# Patient Record
Sex: MALE | Race: BLACK OR AFRICAN AMERICAN | Employment: FULL TIME | URBAN - METROPOLITAN AREA
[De-identification: names, ages, dates, MRNs, and addresses within clinical notes are randomized per-mention and may not be internally consistent; named-entity substitution may affect disease eponyms.]

---

## 2017-01-11 RX ORDER — LORATADINE 10 MG/1
TABLET ORAL
Qty: 30 TABLET | Refills: 3 | Status: SHIPPED | OUTPATIENT
Start: 2017-01-11 | End: 2017-03-28 | Stop reason: ALTCHOICE

## 2017-03-28 ENCOUNTER — OFFICE VISIT (OUTPATIENT)
Dept: INTERNAL MEDICINE CLINIC | Age: 42
End: 2017-03-28

## 2017-03-28 VITALS
WEIGHT: 235 LBS | RESPIRATION RATE: 18 BRPM | BODY MASS INDEX: 31.14 KG/M2 | HEIGHT: 73 IN | HEART RATE: 100 BPM | SYSTOLIC BLOOD PRESSURE: 144 MMHG | DIASTOLIC BLOOD PRESSURE: 90 MMHG | OXYGEN SATURATION: 99 %

## 2017-03-28 DIAGNOSIS — I10 ESSENTIAL HYPERTENSION: Primary | ICD-10-CM

## 2017-03-28 DIAGNOSIS — F41.9 ANXIETY: ICD-10-CM

## 2017-03-28 PROCEDURE — 99213 OFFICE O/P EST LOW 20 MIN: CPT | Performed by: FAMILY MEDICINE

## 2017-03-28 RX ORDER — HYDROXYZINE PAMOATE 25 MG/1
25 CAPSULE ORAL 3 TIMES DAILY PRN
Qty: 30 CAPSULE | Refills: 0 | Status: SHIPPED | OUTPATIENT
Start: 2017-03-28 | End: 2017-04-11

## 2017-03-28 RX ORDER — AMLODIPINE BESYLATE 10 MG/1
10 TABLET ORAL DAILY
Qty: 90 TABLET | Refills: 1 | Status: SHIPPED | OUTPATIENT
Start: 2017-03-28 | End: 2017-06-16 | Stop reason: SDUPTHER

## 2017-03-28 ASSESSMENT — ENCOUNTER SYMPTOMS
BLOOD IN STOOL: 0
ABDOMINAL PAIN: 0
TROUBLE SWALLOWING: 0
DIARRHEA: 0
VOICE CHANGE: 0
SHORTNESS OF BREATH: 0
CONSTIPATION: 0

## 2017-03-31 ENCOUNTER — NURSE ONLY (OUTPATIENT)
Dept: INTERNAL MEDICINE CLINIC | Age: 42
End: 2017-03-31

## 2017-03-31 VITALS — DIASTOLIC BLOOD PRESSURE: 80 MMHG | SYSTOLIC BLOOD PRESSURE: 136 MMHG

## 2017-03-31 DIAGNOSIS — I10 ESSENTIAL HYPERTENSION: Primary | ICD-10-CM

## 2017-03-31 RX ORDER — SILDENAFIL 50 MG/1
50 TABLET, FILM COATED ORAL PRN
Qty: 6 TABLET | Refills: 1 | Status: SHIPPED | OUTPATIENT
Start: 2017-03-31 | End: 2017-12-22

## 2017-04-27 RX ORDER — AMLODIPINE BESYLATE 5 MG/1
TABLET ORAL
Qty: 30 TABLET | Refills: 3 | OUTPATIENT
Start: 2017-04-27

## 2017-06-16 ENCOUNTER — OFFICE VISIT (OUTPATIENT)
Dept: INTERNAL MEDICINE CLINIC | Age: 42
End: 2017-06-16

## 2017-06-16 VITALS
HEART RATE: 92 BPM | OXYGEN SATURATION: 99 % | SYSTOLIC BLOOD PRESSURE: 140 MMHG | RESPIRATION RATE: 20 BRPM | DIASTOLIC BLOOD PRESSURE: 96 MMHG | WEIGHT: 236 LBS | BODY MASS INDEX: 31.57 KG/M2

## 2017-06-16 DIAGNOSIS — Z23 NEED FOR PNEUMOCOCCAL VACCINATION: ICD-10-CM

## 2017-06-16 DIAGNOSIS — E66.9 OBESITY (BMI 30.0-34.9): ICD-10-CM

## 2017-06-16 DIAGNOSIS — N18.3 CKD (CHRONIC KIDNEY DISEASE), STAGE 3 (MODERATE): ICD-10-CM

## 2017-06-16 DIAGNOSIS — I10 ESSENTIAL HYPERTENSION: Primary | ICD-10-CM

## 2017-06-16 DIAGNOSIS — E78.5 HYPERLIPIDEMIA LDL GOAL <100: ICD-10-CM

## 2017-06-16 DIAGNOSIS — F41.9 ANXIETY: ICD-10-CM

## 2017-06-16 PROBLEM — N18.9 CKD (CHRONIC KIDNEY DISEASE): Status: ACTIVE | Noted: 2017-06-16

## 2017-06-16 PROCEDURE — 99214 OFFICE O/P EST MOD 30 MIN: CPT | Performed by: FAMILY MEDICINE

## 2017-06-16 PROCEDURE — 90732 PPSV23 VACC 2 YRS+ SUBQ/IM: CPT | Performed by: FAMILY MEDICINE

## 2017-06-16 PROCEDURE — 90471 IMMUNIZATION ADMIN: CPT | Performed by: FAMILY MEDICINE

## 2017-06-16 RX ORDER — AMLODIPINE BESYLATE 10 MG/1
10 TABLET ORAL DAILY
Qty: 90 TABLET | Refills: 1 | Status: SHIPPED | OUTPATIENT
Start: 2017-06-16 | End: 2017-12-13 | Stop reason: SDUPTHER

## 2017-06-16 ASSESSMENT — ENCOUNTER SYMPTOMS
CONSTIPATION: 0
DIARRHEA: 0
BLOOD IN STOOL: 0
SHORTNESS OF BREATH: 0
TROUBLE SWALLOWING: 0
VOICE CHANGE: 0
ABDOMINAL PAIN: 0

## 2017-06-26 RX ORDER — HYDROCHLOROTHIAZIDE 25 MG/1
TABLET ORAL
Qty: 90 TABLET | Refills: 1 | Status: SHIPPED | OUTPATIENT
Start: 2017-06-26 | End: 2017-09-22 | Stop reason: SDUPTHER

## 2017-06-26 RX ORDER — LISINOPRIL 40 MG/1
TABLET ORAL
Qty: 90 TABLET | Refills: 1 | Status: SHIPPED | OUTPATIENT
Start: 2017-06-26 | End: 2020-03-25 | Stop reason: SDUPTHER

## 2017-09-08 RX ORDER — LORATADINE 10 MG/1
TABLET ORAL
Qty: 30 TABLET | Refills: 3 | Status: SHIPPED | OUTPATIENT
Start: 2017-09-08 | End: 2017-12-22 | Stop reason: SINTOL

## 2017-09-22 ENCOUNTER — OFFICE VISIT (OUTPATIENT)
Dept: INTERNAL MEDICINE CLINIC | Age: 42
End: 2017-09-22

## 2017-09-22 VITALS
BODY MASS INDEX: 32.62 KG/M2 | WEIGHT: 233 LBS | HEART RATE: 92 BPM | RESPIRATION RATE: 18 BRPM | HEIGHT: 71 IN | OXYGEN SATURATION: 96 % | DIASTOLIC BLOOD PRESSURE: 82 MMHG | SYSTOLIC BLOOD PRESSURE: 128 MMHG

## 2017-09-22 DIAGNOSIS — N18.3 CKD (CHRONIC KIDNEY DISEASE), STAGE 3 (MODERATE): ICD-10-CM

## 2017-09-22 DIAGNOSIS — I10 ESSENTIAL HYPERTENSION: ICD-10-CM

## 2017-09-22 DIAGNOSIS — E78.5 HYPERLIPIDEMIA LDL GOAL <100: ICD-10-CM

## 2017-09-22 DIAGNOSIS — Z23 NEED FOR INFLUENZA VACCINATION: ICD-10-CM

## 2017-09-22 PROCEDURE — 99214 OFFICE O/P EST MOD 30 MIN: CPT | Performed by: FAMILY MEDICINE

## 2017-09-22 PROCEDURE — 90686 IIV4 VACC NO PRSV 0.5 ML IM: CPT | Performed by: FAMILY MEDICINE

## 2017-09-22 PROCEDURE — 90471 IMMUNIZATION ADMIN: CPT | Performed by: FAMILY MEDICINE

## 2017-09-22 RX ORDER — HYDROCHLOROTHIAZIDE 25 MG/1
TABLET ORAL
Qty: 90 TABLET | Refills: 1 | Status: SHIPPED | OUTPATIENT
Start: 2017-09-22 | End: 2018-04-09 | Stop reason: SDUPTHER

## 2017-09-22 ASSESSMENT — ENCOUNTER SYMPTOMS
SHORTNESS OF BREATH: 0
CONSTIPATION: 0
ABDOMINAL PAIN: 0
TROUBLE SWALLOWING: 0
VOICE CHANGE: 0
BLOOD IN STOOL: 0
DIARRHEA: 0

## 2017-12-13 RX ORDER — AMLODIPINE BESYLATE 10 MG/1
10 TABLET ORAL DAILY
Qty: 90 TABLET | Refills: 1 | Status: SHIPPED | OUTPATIENT
Start: 2017-12-13 | End: 2017-12-22 | Stop reason: SDUPTHER

## 2017-12-18 RX ORDER — AMLODIPINE BESYLATE 10 MG/1
10 TABLET ORAL DAILY
Qty: 90 TABLET | Refills: 1 | Status: SHIPPED | OUTPATIENT
Start: 2017-12-18 | End: 2018-08-20

## 2017-12-22 ENCOUNTER — OFFICE VISIT (OUTPATIENT)
Dept: INTERNAL MEDICINE CLINIC | Age: 42
End: 2017-12-22

## 2017-12-22 VITALS
WEIGHT: 232 LBS | BODY MASS INDEX: 32.82 KG/M2 | DIASTOLIC BLOOD PRESSURE: 86 MMHG | SYSTOLIC BLOOD PRESSURE: 138 MMHG | HEART RATE: 116 BPM

## 2017-12-22 DIAGNOSIS — Z72.0 NICOTINE ABUSE: ICD-10-CM

## 2017-12-22 DIAGNOSIS — I10 ESSENTIAL HYPERTENSION: ICD-10-CM

## 2017-12-22 DIAGNOSIS — F41.9 ANXIETY: ICD-10-CM

## 2017-12-22 DIAGNOSIS — E78.5 HYPERLIPIDEMIA LDL GOAL <100: ICD-10-CM

## 2017-12-22 PROCEDURE — 99214 OFFICE O/P EST MOD 30 MIN: CPT | Performed by: FAMILY MEDICINE

## 2017-12-22 RX ORDER — NICOTINE 21 MG/24HR
1 PATCH, TRANSDERMAL 24 HOURS TRANSDERMAL EVERY 24 HOURS
Qty: 30 PATCH | Refills: 3 | Status: SHIPPED | OUTPATIENT
Start: 2017-12-22 | End: 2020-03-25

## 2017-12-22 RX ORDER — BUPROPION HYDROCHLORIDE 150 MG/1
150 TABLET ORAL EVERY MORNING
Qty: 30 TABLET | Refills: 3 | Status: SHIPPED | OUTPATIENT
Start: 2017-12-22 | End: 2020-03-25

## 2017-12-22 ASSESSMENT — ENCOUNTER SYMPTOMS
CONSTIPATION: 0
ABDOMINAL PAIN: 0
VOICE CHANGE: 0
BLOOD IN STOOL: 0
TROUBLE SWALLOWING: 0
SHORTNESS OF BREATH: 0
DIARRHEA: 0

## 2018-03-05 RX ORDER — AMLODIPINE BESYLATE 10 MG/1
TABLET ORAL
Qty: 90 TABLET | Refills: 1 | Status: SHIPPED | OUTPATIENT
Start: 2018-03-05 | End: 2018-11-19

## 2018-04-09 RX ORDER — HYDROCHLOROTHIAZIDE 25 MG/1
TABLET ORAL
Qty: 90 TABLET | Refills: 1 | Status: SHIPPED | OUTPATIENT
Start: 2018-04-09 | End: 2020-03-25 | Stop reason: SDUPTHER

## 2018-08-20 RX ORDER — LORATADINE 10 MG/1
10 TABLET ORAL DAILY
Qty: 90 TABLET | Refills: 1 | Status: SHIPPED | OUTPATIENT
Start: 2018-08-20 | End: 2020-03-25

## 2018-10-19 RX ORDER — AMLODIPINE BESYLATE 10 MG/1
10 TABLET ORAL DAILY
Qty: 30 TABLET | Refills: 0 | Status: SHIPPED | OUTPATIENT
Start: 2018-10-19 | End: 2020-03-25 | Stop reason: SDUPTHER

## 2018-11-19 RX ORDER — AMLODIPINE BESYLATE 10 MG/1
TABLET ORAL
Qty: 30 TABLET | Refills: 0 | OUTPATIENT
Start: 2018-11-19

## 2019-06-13 RX ORDER — BUPROPION HYDROCHLORIDE 150 MG/1
150 TABLET ORAL EVERY MORNING
Qty: 30 TABLET | Refills: 0 | OUTPATIENT
Start: 2019-06-13

## 2019-09-13 RX ORDER — LORATADINE 10 MG/1
TABLET ORAL
Qty: 90 TABLET | Refills: 0 | OUTPATIENT
Start: 2019-09-13

## 2020-03-25 ENCOUNTER — OFFICE VISIT (OUTPATIENT)
Dept: INTERNAL MEDICINE CLINIC | Age: 45
End: 2020-03-25
Payer: COMMERCIAL

## 2020-03-25 VITALS
DIASTOLIC BLOOD PRESSURE: 130 MMHG | HEIGHT: 71 IN | WEIGHT: 208.8 LBS | BODY MASS INDEX: 29.23 KG/M2 | RESPIRATION RATE: 18 BRPM | HEART RATE: 92 BPM | SYSTOLIC BLOOD PRESSURE: 210 MMHG | OXYGEN SATURATION: 99 %

## 2020-03-25 LAB
A/G RATIO: 1.2 (ref 1.1–2.2)
ALBUMIN SERPL-MCNC: 4.2 G/DL (ref 3.4–5)
ALP BLD-CCNC: 85 U/L (ref 40–129)
ALT SERPL-CCNC: 8 U/L (ref 10–40)
ANION GAP SERPL CALCULATED.3IONS-SCNC: 17 MMOL/L (ref 3–16)
AST SERPL-CCNC: 15 U/L (ref 15–37)
BASOPHILS ABSOLUTE: 0.1 K/UL (ref 0–0.2)
BASOPHILS RELATIVE PERCENT: 0.6 %
BILIRUB SERPL-MCNC: 0.8 MG/DL (ref 0–1)
BUN BLDV-MCNC: 26 MG/DL (ref 7–20)
CALCIUM SERPL-MCNC: 9.6 MG/DL (ref 8.3–10.6)
CHLORIDE BLD-SCNC: 102 MMOL/L (ref 99–110)
CHOLESTEROL, FASTING: 200 MG/DL (ref 0–199)
CO2: 22 MMOL/L (ref 21–32)
CREAT SERPL-MCNC: 4 MG/DL (ref 0.9–1.3)
EOSINOPHILS ABSOLUTE: 0.3 K/UL (ref 0–0.6)
EOSINOPHILS RELATIVE PERCENT: 2.1 %
GFR AFRICAN AMERICAN: 20
GFR NON-AFRICAN AMERICAN: 16
GLOBULIN: 3.4 G/DL
GLUCOSE FASTING: 125 MG/DL (ref 70–99)
HCT VFR BLD CALC: 41.9 % (ref 40.5–52.5)
HDLC SERPL-MCNC: 51 MG/DL (ref 40–60)
HEMOGLOBIN: 13.6 G/DL (ref 13.5–17.5)
LDL CHOLESTEROL CALCULATED: 128 MG/DL
LYMPHOCYTES ABSOLUTE: 2.2 K/UL (ref 1–5.1)
LYMPHOCYTES RELATIVE PERCENT: 15 %
MCH RBC QN AUTO: 29.2 PG (ref 26–34)
MCHC RBC AUTO-ENTMCNC: 32.5 G/DL (ref 31–36)
MCV RBC AUTO: 89.8 FL (ref 80–100)
MONOCYTES ABSOLUTE: 0.9 K/UL (ref 0–1.3)
MONOCYTES RELATIVE PERCENT: 5.8 %
NEUTROPHILS ABSOLUTE: 11.3 K/UL (ref 1.7–7.7)
NEUTROPHILS RELATIVE PERCENT: 76.5 %
PDW BLD-RTO: 14.5 % (ref 12.4–15.4)
PLATELET # BLD: 235 K/UL (ref 135–450)
PMV BLD AUTO: 10 FL (ref 5–10.5)
POTASSIUM SERPL-SCNC: 3.3 MMOL/L (ref 3.5–5.1)
RBC # BLD: 4.67 M/UL (ref 4.2–5.9)
SODIUM BLD-SCNC: 141 MMOL/L (ref 136–145)
T4 FREE: 1.3 NG/DL (ref 0.9–1.8)
TOTAL PROTEIN: 7.6 G/DL (ref 6.4–8.2)
TRIGLYCERIDE, FASTING: 105 MG/DL (ref 0–150)
TSH SERPL DL<=0.05 MIU/L-ACNC: 1.57 UIU/ML (ref 0.27–4.2)
VLDLC SERPL CALC-MCNC: 21 MG/DL
WBC # BLD: 14.8 K/UL (ref 4–11)

## 2020-03-25 PROCEDURE — 99214 OFFICE O/P EST MOD 30 MIN: CPT | Performed by: FAMILY MEDICINE

## 2020-03-25 RX ORDER — AMLODIPINE BESYLATE 10 MG/1
10 TABLET ORAL DAILY
Qty: 90 TABLET | Refills: 1 | Status: SHIPPED | OUTPATIENT
Start: 2020-03-25 | End: 2020-12-07 | Stop reason: SDUPTHER

## 2020-03-25 RX ORDER — HYDROCHLOROTHIAZIDE 25 MG/1
TABLET ORAL
Qty: 90 TABLET | Refills: 1 | Status: SHIPPED | OUTPATIENT
Start: 2020-03-25 | End: 2020-09-14

## 2020-03-25 RX ORDER — LISINOPRIL 40 MG/1
TABLET ORAL
Qty: 90 TABLET | Refills: 1 | Status: SHIPPED | OUTPATIENT
Start: 2020-03-25 | End: 2020-09-22

## 2020-03-25 RX ORDER — PANTOPRAZOLE SODIUM 40 MG/1
40 TABLET, DELAYED RELEASE ORAL
Qty: 30 TABLET | Refills: 5 | Status: SHIPPED | OUTPATIENT
Start: 2020-03-25 | End: 2020-12-07 | Stop reason: SDUPTHER

## 2020-03-25 SDOH — ECONOMIC STABILITY: INCOME INSECURITY: HOW HARD IS IT FOR YOU TO PAY FOR THE VERY BASICS LIKE FOOD, HOUSING, MEDICAL CARE, AND HEATING?: SOMEWHAT HARD

## 2020-03-25 SDOH — ECONOMIC STABILITY: FOOD INSECURITY: WITHIN THE PAST 12 MONTHS, THE FOOD YOU BOUGHT JUST DIDN'T LAST AND YOU DIDN'T HAVE MONEY TO GET MORE.: SOMETIMES TRUE

## 2020-03-25 SDOH — ECONOMIC STABILITY: FOOD INSECURITY: WITHIN THE PAST 12 MONTHS, YOU WORRIED THAT YOUR FOOD WOULD RUN OUT BEFORE YOU GOT MONEY TO BUY MORE.: SOMETIMES TRUE

## 2020-03-25 SDOH — ECONOMIC STABILITY: TRANSPORTATION INSECURITY
IN THE PAST 12 MONTHS, HAS THE LACK OF TRANSPORTATION KEPT YOU FROM MEDICAL APPOINTMENTS OR FROM GETTING MEDICATIONS?: NO

## 2020-03-25 SDOH — ECONOMIC STABILITY: TRANSPORTATION INSECURITY
IN THE PAST 12 MONTHS, HAS LACK OF TRANSPORTATION KEPT YOU FROM MEETINGS, WORK, OR FROM GETTING THINGS NEEDED FOR DAILY LIVING?: NO

## 2020-03-25 ASSESSMENT — ENCOUNTER SYMPTOMS
CONSTIPATION: 0
TROUBLE SWALLOWING: 0
VOICE CHANGE: 0
SHORTNESS OF BREATH: 0
ABDOMINAL PAIN: 0
DIARRHEA: 0
BLOOD IN STOOL: 0

## 2020-03-25 ASSESSMENT — PATIENT HEALTH QUESTIONNAIRE - PHQ9
SUM OF ALL RESPONSES TO PHQ9 QUESTIONS 1 & 2: 1
2. FEELING DOWN, DEPRESSED OR HOPELESS: 0
SUM OF ALL RESPONSES TO PHQ QUESTIONS 1-9: 1
SUM OF ALL RESPONSES TO PHQ QUESTIONS 1-9: 1
1. LITTLE INTEREST OR PLEASURE IN DOING THINGS: 1

## 2020-03-25 NOTE — PROGRESS NOTES
to poor food intake started after working second shift. Will order blood tests including CBC, TSH and free T4 and CMP. We will also order CT of the abdomen to rule out occult malignancy. - CT ABDOMEN PELVIS W IV CONTRAST Additional Contrast? Radiologist Recommendation; Future    3. Essential hypertension  Poorly controlled due to poor compliance. Restart amlodipine 10 mg daily, hydrochlorothiazide 25 mg daily and lisinopril 40 mg daily. Will recheck blood pressure in 2 to 4 weeks.         An electronic signature was used to authenticate this note.    --Jeniffer Raphael MD on 3/25/2020 at 11:41 AM

## 2020-03-26 ENCOUNTER — TELEPHONE (OUTPATIENT)
Dept: INTERNAL MEDICINE CLINIC | Age: 45
End: 2020-03-26

## 2020-05-11 ENCOUNTER — TELEPHONE (OUTPATIENT)
Dept: INTERNAL MEDICINE CLINIC | Age: 45
End: 2020-05-11

## 2020-05-12 NOTE — TELEPHONE ENCOUNTER
He did not go to the kidney specialist due to the cost $2500.00. That is his deductible. He has stopped all supplements for 2 months. He will try to go next month. I told him it very important to go to the specialist to prevent further damage to his kidneys. Could recheck the labs?

## 2020-05-13 RX ORDER — BLOOD PRESSURE TEST KIT
KIT MISCELLANEOUS
Qty: 1 KIT | Refills: 0 | Status: SHIPPED | OUTPATIENT
Start: 2020-05-13 | End: 2022-10-14

## 2020-05-13 NOTE — TELEPHONE ENCOUNTER
Per Dr Nataly Denny:  Repeat blood test in one week if not better, need to see a kidney specialist.   Be sure BP is controlled. Called pt. Left a detailed VM of this. Advised to go to [facility] for lab draw. Orders placed.

## 2020-09-14 RX ORDER — HYDROCHLOROTHIAZIDE 25 MG/1
TABLET ORAL
Qty: 90 TABLET | Refills: 1 | Status: SHIPPED | OUTPATIENT
Start: 2020-09-14 | End: 2020-09-22

## 2020-12-07 ENCOUNTER — OFFICE VISIT (OUTPATIENT)
Dept: PRIMARY CARE CLINIC | Age: 45
End: 2020-12-07
Payer: COMMERCIAL

## 2020-12-07 VITALS
HEART RATE: 94 BPM | WEIGHT: 270.8 LBS | SYSTOLIC BLOOD PRESSURE: 151 MMHG | BODY MASS INDEX: 37.77 KG/M2 | RESPIRATION RATE: 18 BRPM | DIASTOLIC BLOOD PRESSURE: 102 MMHG | TEMPERATURE: 97.7 F | OXYGEN SATURATION: 97 %

## 2020-12-07 PROBLEM — R73.09 ELEVATED GLUCOSE: Status: ACTIVE | Noted: 2020-12-07

## 2020-12-07 PROBLEM — Z91.199 POOR COMPLIANCE: Status: ACTIVE | Noted: 2020-12-07

## 2020-12-07 LAB — HBA1C MFR BLD: 6.2 %

## 2020-12-07 PROCEDURE — 90471 IMMUNIZATION ADMIN: CPT | Performed by: FAMILY MEDICINE

## 2020-12-07 PROCEDURE — 83036 HEMOGLOBIN GLYCOSYLATED A1C: CPT | Performed by: FAMILY MEDICINE

## 2020-12-07 PROCEDURE — 90686 IIV4 VACC NO PRSV 0.5 ML IM: CPT | Performed by: FAMILY MEDICINE

## 2020-12-07 PROCEDURE — 99214 OFFICE O/P EST MOD 30 MIN: CPT | Performed by: FAMILY MEDICINE

## 2020-12-07 RX ORDER — AMLODIPINE BESYLATE 10 MG/1
10 TABLET ORAL DAILY
Qty: 90 TABLET | Refills: 1 | Status: SHIPPED | OUTPATIENT
Start: 2020-12-07 | End: 2021-10-06 | Stop reason: SDUPTHER

## 2020-12-07 RX ORDER — METOPROLOL SUCCINATE 50 MG/1
50 TABLET, EXTENDED RELEASE ORAL DAILY
Qty: 90 TABLET | Refills: 1 | Status: SHIPPED | OUTPATIENT
Start: 2020-12-07 | End: 2021-10-06 | Stop reason: SDUPTHER

## 2020-12-07 RX ORDER — PANTOPRAZOLE SODIUM 40 MG/1
40 TABLET, DELAYED RELEASE ORAL
Qty: 30 TABLET | Refills: 5 | Status: SHIPPED | OUTPATIENT
Start: 2020-12-07 | End: 2022-10-14

## 2020-12-07 ASSESSMENT — ENCOUNTER SYMPTOMS
DIARRHEA: 0
ABDOMINAL PAIN: 0
TROUBLE SWALLOWING: 0
VOICE CHANGE: 0
BLOOD IN STOOL: 0
CONSTIPATION: 0
SHORTNESS OF BREATH: 0

## 2020-12-07 NOTE — PROGRESS NOTES
2020     Jennifer Beyer (:  1975) is a 39 y.o. male, here for evaluation of the following medical concerns:    HPI  Patient is poorly compliant with medication and office visit. He was recently diagnosed with stage V chronic kidney disease likely due to poorly controlled hypertension. He now takes amlodipine 10 mg daily His blood pressure last visit was  520 systolic blood pressure now is 161 systolic. He denies chest pain or shortness of breath denies leg edema. Despite stage V chronic kidney disease patient denies any symptoms. He has no nausea or vomiting. He has hyperlipidemia not yet on a statin. He has elevated glucose but no overt sign of diabetes. Denies polyuria polydipsia. Review of Systems   Constitutional: Negative for activity change. HENT: Negative for trouble swallowing and voice change. Eyes: Negative for visual disturbance. Respiratory: Negative for shortness of breath. Cardiovascular: Negative for chest pain and leg swelling. Gastrointestinal: Negative for abdominal pain, blood in stool, constipation and diarrhea. Genitourinary: Negative for difficulty urinating, dysuria, frequency, hematuria and scrotal swelling. Musculoskeletal: Negative for arthralgias and myalgias. Skin: Negative for rash. Neurological: Negative for dizziness. Psychiatric/Behavioral: Negative for behavioral problems. Prior to Visit Medications    Medication Sig Taking?  Authorizing Provider   amLODIPine (NORVASC) 10 MG tablet Take 1 tablet by mouth daily Yes Paola Ocasio MD   pantoprazole (PROTONIX) 40 MG tablet Take 1 tablet by mouth every morning (before breakfast) Yes Paloa Ocasio MD   Tdap (ADACEL) 5-2-15.5 LF-MCG/0.5 injection Inject 0.5 mLs into the muscle once for 1 dose Yes Paola Ocasio MD   metoprolol succinate (TOPROL XL) 50 MG extended release tablet Take 1 tablet by mouth daily Yes Paola Ocasio MD   Blood Pressure KIT Check Blood Pressure 1-2 Stage 5 chronic kidney disease not on chronic dialysis [facility])  Patient reported that he has no symptoms and feeling okay. Will refer to nephrologist for further evaluation and management. - Lipid Panel; Future  - Comprehensive Metabolic Panel; Future  - CBC Auto Differential; Future  - AFL - Zev Rios MD, Nephrology, [facility]    3. Hyperlipidemia LDL goal <100  Will recheck lipid profile and if LDL is still elevated will start  statin next visit. 4. Elevated glucose  Patient has no overt sign of diabetes. Denies polyuria polydipsia. - POCT glycosylated hemoglobin (Hb A1C)    5.  Need for influenza vaccination  - INFLUENZA, QUADV, 3 YRS AND OLDER, IM PF, PREFILL SYR OR SDV, 0.5ML (AFLURIA QUADV, PF)      RTC in 2-3 mos and PRN    An electronic signature was used to authenticate this note.    --Eduardo Altamirano MD on 12/7/2020 at 6:02 PM

## 2020-12-07 NOTE — PROGRESS NOTES
Vaccine Information Sheet, \"Influenza - Inactivated\"  given to Marvin Castillo, or parent/legal guardian of  Marvin Castillo and verbalized understanding. Patient responses:    Have you ever had a reaction to a flu vaccine? No  Do you have any current illness? No  Have you ever had Guillian [place] Syndrome? No  Do you have a serious allergy to any of the follow: Neomycin, Polymyxin, Thimerosal, eggs or egg products? No    Flu vaccine given per order. Please see immunization tab. Risks and benefits explained. Current VIS given.

## 2021-10-06 ENCOUNTER — OFFICE VISIT (OUTPATIENT)
Dept: PRIMARY CARE CLINIC | Age: 46
End: 2021-10-06
Payer: COMMERCIAL

## 2021-10-06 VITALS
OXYGEN SATURATION: 95 % | DIASTOLIC BLOOD PRESSURE: 131 MMHG | BODY MASS INDEX: 36.26 KG/M2 | RESPIRATION RATE: 18 BRPM | HEART RATE: 96 BPM | SYSTOLIC BLOOD PRESSURE: 177 MMHG | WEIGHT: 260 LBS

## 2021-10-06 DIAGNOSIS — Z00.00 ENCOUNTER FOR WELL ADULT EXAM WITHOUT ABNORMAL FINDINGS: ICD-10-CM

## 2021-10-06 DIAGNOSIS — E66.9 OBESITY (BMI 30.0-34.9): ICD-10-CM

## 2021-10-06 DIAGNOSIS — Z91.199 POOR COMPLIANCE: ICD-10-CM

## 2021-10-06 DIAGNOSIS — Z11.4 SCREENING FOR HIV (HUMAN IMMUNODEFICIENCY VIRUS): ICD-10-CM

## 2021-10-06 DIAGNOSIS — R73.09 ELEVATED GLUCOSE: ICD-10-CM

## 2021-10-06 DIAGNOSIS — Z23 NEED FOR INFLUENZA VACCINATION: ICD-10-CM

## 2021-10-06 DIAGNOSIS — Z12.11 SCREENING FOR COLORECTAL CANCER: ICD-10-CM

## 2021-10-06 DIAGNOSIS — I10 ESSENTIAL HYPERTENSION: ICD-10-CM

## 2021-10-06 DIAGNOSIS — Z11.59 NEED FOR HEPATITIS C SCREENING TEST: ICD-10-CM

## 2021-10-06 DIAGNOSIS — Z00.00 PREVENTATIVE HEALTH CARE: Primary | ICD-10-CM

## 2021-10-06 DIAGNOSIS — Z23 NEED FOR IMMUNIZATION AGAINST INFLUENZA: ICD-10-CM

## 2021-10-06 DIAGNOSIS — Z12.12 SCREENING FOR COLORECTAL CANCER: ICD-10-CM

## 2021-10-06 DIAGNOSIS — N18.5 STAGE 5 CHRONIC KIDNEY DISEASE NOT ON CHRONIC DIALYSIS (HCC): ICD-10-CM

## 2021-10-06 PROCEDURE — 99396 PREV VISIT EST AGE 40-64: CPT | Performed by: FAMILY MEDICINE

## 2021-10-06 PROCEDURE — 90471 IMMUNIZATION ADMIN: CPT | Performed by: FAMILY MEDICINE

## 2021-10-06 PROCEDURE — 90694 VACC AIIV4 NO PRSRV 0.5ML IM: CPT | Performed by: FAMILY MEDICINE

## 2021-10-06 RX ORDER — METOPROLOL SUCCINATE 100 MG/1
100 TABLET, EXTENDED RELEASE ORAL DAILY
Qty: 90 TABLET | Refills: 1 | Status: SHIPPED | OUTPATIENT
Start: 2021-10-06 | End: 2022-04-11

## 2021-10-06 RX ORDER — AMLODIPINE BESYLATE 10 MG/1
10 TABLET ORAL DAILY
Qty: 90 TABLET | Refills: 1 | Status: SHIPPED | OUTPATIENT
Start: 2021-10-06 | End: 2022-04-11

## 2021-10-06 SDOH — ECONOMIC STABILITY: FOOD INSECURITY: WITHIN THE PAST 12 MONTHS, YOU WORRIED THAT YOUR FOOD WOULD RUN OUT BEFORE YOU GOT MONEY TO BUY MORE.: NEVER TRUE

## 2021-10-06 SDOH — ECONOMIC STABILITY: FOOD INSECURITY: WITHIN THE PAST 12 MONTHS, THE FOOD YOU BOUGHT JUST DIDN'T LAST AND YOU DIDN'T HAVE MONEY TO GET MORE.: NEVER TRUE

## 2021-10-06 ASSESSMENT — PATIENT HEALTH QUESTIONNAIRE - PHQ9
SUM OF ALL RESPONSES TO PHQ QUESTIONS 1-9: 0
SUM OF ALL RESPONSES TO PHQ QUESTIONS 1-9: 0
SUM OF ALL RESPONSES TO PHQ9 QUESTIONS 1 & 2: 0
SUM OF ALL RESPONSES TO PHQ QUESTIONS 1-9: 0
2. FEELING DOWN, DEPRESSED OR HOPELESS: 0
1. LITTLE INTEREST OR PLEASURE IN DOING THINGS: 0

## 2021-10-06 ASSESSMENT — SOCIAL DETERMINANTS OF HEALTH (SDOH): HOW HARD IS IT FOR YOU TO PAY FOR THE VERY BASICS LIKE FOOD, HOUSING, MEDICAL CARE, AND HEATING?: NOT HARD AT ALL

## 2021-10-06 ASSESSMENT — ENCOUNTER SYMPTOMS
SHORTNESS OF BREATH: 0
VOICE CHANGE: 0
CONSTIPATION: 0
TROUBLE SWALLOWING: 0
DIARRHEA: 0
BLOOD IN STOOL: 0
ABDOMINAL PAIN: 0

## 2021-10-06 NOTE — PROGRESS NOTES
Well Adult Note  Name: Li Mccoy Date: 10/6/2021   MRN: [id number] Sex: Male   Age: 39 y.o. Ethnicity: Non- / Non    : 1975 Race: Ruthann Ro / African American      Dejuan Somers is here for well adult exam.  History:  Patient presented to the office for checkup. He has a new job and requested clearance to go to work. He was told that because of his HTN needs to be cleared before he can go  to work. Has hypertension and poorly compliant with medication. He has not refilled his medicine for months. He takes Toprol-XL 50 mg daily and amlodipine 10 mg daily. He is no longer on lisinopril and hydrochlorothiazide due to chronic kidney disease stage V which has gotten worse in the past 1 year. His chronic kidney disease likely related to poorly controlled hypertension. He has impaired fasting glucose but no overt sign of diabetes. Denies polyuria and polydipsia. He is obese and struggling to lose weight. Patient reported \" I dont feel bad\". Denies nausea and vomiting, denies SOB or chest pain,denies bowel or urinary disturbances. Denies leg edema      Review of Systems   Constitutional: Negative for activity change. HENT: Negative for trouble swallowing and voice change. Eyes: Negative for visual disturbance. Respiratory: Negative for shortness of breath. Cardiovascular: Negative for chest pain and leg swelling. Gastrointestinal: Negative for abdominal pain, blood in stool, constipation and diarrhea. Genitourinary: Negative for difficulty urinating, dysuria, frequency, hematuria and scrotal swelling. Musculoskeletal: Negative for arthralgias and myalgias. Skin: Negative for rash. Neurological: Negative for dizziness. Psychiatric/Behavioral: Negative for behavioral problems. No Known Allergies      Prior to Visit Medications    Medication Sig Taking?  Authorizing Provider   amLODIPine (NORVASC) 10 MG tablet Take 1 tablet by mouth daily Yes Edgar Delgado MD   metoprolol succinate (TOPROL XL) 100 MG extended release tablet Take 1 tablet by mouth daily Yes Larisa Bergeron MD   pantoprazole (PROTONIX) 40 MG tablet Take 1 tablet by mouth every morning (before breakfast)  Patient not taking: Reported on 10/6/2021  Larisa Bergeron MD   Blood Pressure KIT Check Blood Pressure 1-2 times daily. Larisa Bergeron MD         Past Medical History:   Diagnosis Date    Anxiety 3/28/2017    CKD (chronic kidney disease) 6/16/2017    Hyperlipidemia LDL goal <100 6/16/2017    Hypertension     Obesity (BMI 30.0-34.9) 6/16/2017       No past surgical history on file. No family history on file. Social History     Tobacco Use    Smoking status: Current Some Day Smoker     Types: Cigars    Smokeless tobacco: Never Used    Tobacco comment:  2 a week   Substance Use Topics    Alcohol use: No    Drug use: No       Objective   BP (!) 177/131   Pulse 96   Resp 18   Wt 260 lb (117.9 kg)   SpO2 95%   BMI 36.26 kg/m²   Wt Readings from Last 3 Encounters:   10/06/21 260 lb (117.9 kg)   12/07/20 270 lb 12.8 oz (122.8 kg)   03/25/20 208 lb 12.8 oz (94.7 kg)       Physical Exam  Constitutional:       General: He is not in acute distress. Appearance: He is well-developed. HENT:      Head: Normocephalic. Eyes:      Conjunctiva/sclera: Conjunctivae normal.   Neck:      Thyroid: No thyromegaly. Cardiovascular:      Rate and Rhythm: Normal rate and regular rhythm. Heart sounds: Normal heart sounds. No murmur heard. Pulmonary:      Effort: No respiratory distress. Breath sounds: Normal breath sounds. No wheezing or rales. Abdominal:      General: There is no distension. Palpations: Abdomen is soft. Musculoskeletal:         General: Normal range of motion. Cervical back: Neck supple. Skin:     General: Skin is warm. Findings: No rash. Neurological:      Mental Status: He is alert and oriented to person, place, and time.    Psychiatric: Behavior: Behavior normal.           Assessment   Plan   1. Preventative health care  Will order blood test and update health maintenance record. Patient is due for colon cancer screening. Will give flu shot today. -     CBC Auto Differential; Future  -     T4, Free; Future  -     TSH without Reflex; Future  -     Lipid, Fasting; Future  -     Comprehensive Metabolic Panel, Fasting; Future  -     HIV Screen; Future  -     Hepatitis C Antibody; Future  -     Hemoglobin A1C; Future    2. Essential hypertension  Poorly compliant with medication. For months has not  refill his medication. Blood pressure still elevated today. He Is no longer on lisinopril and hydrochlorothiazide due to chronic kidney disease stage V . Will increase Toprol-XL from 50 mg to 100 mg daily. Continue amlodipine 10 mg daily. Will come back next week to recheck BP    3. Stage 5 chronic kidney disease not on chronic dialysis [facility])  Has not seen the nephrologist , Dr Kim Santiago despite multiple referral.  Patient reported that \" they ask for $ 2000 upfront fee \" before he can be seen and \" I dont have that much money\"  Advised to avoid NSAIDs and other nephrotoxic drug. Avoid dehydration. Will refer to Dr Greg Villalobos next visit    4. Obesity (BMI 30.0-34. 9)  Encouraged to lose weight with diet and exercise. 5. Poor compliance  He Is poorly compliant with office visit and medication. He is close to getting dis. letter    6. Screening for HIV (human immunodeficiency virus)  -     HIV Screen; Future    7. Need for hepatitis C screening test  -     Hepatitis C Antibody; Future    8. Elevated glucose  -     Hemoglobin A1C; Future    9.  Need for influenza vaccination  -     INFLUENZA, QUADV, ADJUVANTED, 72 YRS =, IM, PF, PREFILL SYR, 0.5ML (FLUAD)         Personalized Preventive Plan   Current Health Maintenance Status  Immunization History   Administered Date(s) Administered    COVID-19, J&J, PF, 0.5 mL 04/10/2021    Influenza, Quadv, IM, PF (6 mo and older Fluzone, Flulaval, Fluarix, and 3 yrs and older Afluria) 09/22/2017, 12/07/2020    Influenza, Quadv, adjuvanted, 65 yrs +, IM, PF (Fluad) 10/06/2021    Pneumococcal Polysaccharide ([id number]) 06/16/2017    Td, unspecified formulation 03/15/2011        Health Maintenance   Topic Date Due    Hepatitis C screen  Never done    HIV screen  Never done    DTaP/Tdap/Td vaccine (1 - Tdap) 03/16/2011    Colon cancer screen colonoscopy  Never done    Potassium monitoring  03/25/2021    Creatinine monitoring  03/25/2021    A1C test (Diabetic or Prediabetic)  12/07/2021    Lipid screen  03/25/2025    Pneumococcal 0-64 years Vaccine (2 of 2 - PPSV23) 11/19/2040    Flu vaccine  Completed    COVID-19 Vaccine  Completed    Hepatitis A vaccine  Aged Out    Hepatitis B vaccine  Aged Out    Hib vaccine  Aged Out    Meningococcal (ACWY) vaccine  Aged Out     Recommendations for Massive Damage Due: see orders and patient instructions/AVS.  .

## 2021-10-06 NOTE — PROGRESS NOTES
Vaccine Information Sheet, \"Influenza - Inactivated\"  given to Teresita Trotter, or parent/legal guardian of  Teresita Trotter and verbalized understanding. Patient responses:    Have you ever had a reaction to a flu vaccine? No  Do you have any current illness? No  Have you ever had Guillian [place] Syndrome? No  Do you have a serious allergy to any of the follow: Neomycin, Polymyxin, Thimerosal, eggs or egg products? No    Flu vaccine given per order. Please see immunization tab. Risks and benefits explained. Current VIS given.

## 2021-10-22 ENCOUNTER — TELEPHONE (OUTPATIENT)
Dept: PRIMARY CARE CLINIC | Age: 46
End: 2021-10-22

## 2021-10-22 NOTE — TELEPHONE ENCOUNTER
Per Dr Irma Castaneda refer to Nephrologist DR Sariah Howe [phone]. The patient has been notified of this information and all questions answered.

## 2021-11-10 ENCOUNTER — TELEPHONE (OUTPATIENT)
Dept: PRIMARY CARE CLINIC | Age: 46
End: 2021-11-10

## 2021-11-10 NOTE — TELEPHONE ENCOUNTER
Left detailed VM for pt:  POC Fit test not returned yet. Please try to send it back. If any questions, please call.

## 2022-04-11 RX ORDER — METOPROLOL SUCCINATE 100 MG/1
100 TABLET, EXTENDED RELEASE ORAL DAILY
Qty: 90 TABLET | Refills: 0 | Status: SHIPPED | OUTPATIENT
Start: 2022-04-11 | End: 2022-08-11

## 2022-04-11 RX ORDER — AMLODIPINE BESYLATE 10 MG/1
10 TABLET ORAL DAILY
Qty: 90 TABLET | Refills: 0 | Status: SHIPPED | OUTPATIENT
Start: 2022-04-11 | End: 2022-08-11

## 2022-04-11 NOTE — TELEPHONE ENCOUNTER
Pt needs to be seen again for any further refills because:     1) last BP reading was 177/131    2) A1C is now showing DM. 3) had No-Show in January 2022.

## 2022-06-21 ENCOUNTER — PATIENT MESSAGE (OUTPATIENT)
Dept: PRIMARY CARE CLINIC | Age: 47
End: 2022-06-21
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
## 2022-06-23 ENCOUNTER — OFFICE VISIT (OUTPATIENT)
Dept: PRIMARY CARE CLINIC | Age: 47
End: 2022-06-23
Payer: COMMERCIAL

## 2022-06-23 VITALS
BODY MASS INDEX: 32.2 KG/M2 | HEART RATE: 84 BPM | SYSTOLIC BLOOD PRESSURE: 167 MMHG | HEIGHT: 71 IN | DIASTOLIC BLOOD PRESSURE: 119 MMHG | TEMPERATURE: 99.1 F | WEIGHT: 230 LBS | OXYGEN SATURATION: 98 %

## 2022-06-23 DIAGNOSIS — I10 ESSENTIAL HYPERTENSION: ICD-10-CM

## 2022-06-23 DIAGNOSIS — M54.50 ACUTE LOW BACK PAIN WITHOUT SCIATICA, UNSPECIFIED BACK PAIN LATERALITY: Primary | ICD-10-CM

## 2022-06-23 DIAGNOSIS — R73.09 ELEVATED GLUCOSE: ICD-10-CM

## 2022-06-23 DIAGNOSIS — N18.5 STAGE 5 CHRONIC KIDNEY DISEASE NOT ON CHRONIC DIALYSIS (HCC): ICD-10-CM

## 2022-06-23 DIAGNOSIS — M54.2 NECK PAIN: ICD-10-CM

## 2022-06-23 LAB — HBA1C MFR BLD: 6 %

## 2022-06-23 PROCEDURE — 99214 OFFICE O/P EST MOD 30 MIN: CPT | Performed by: NURSE PRACTITIONER

## 2022-06-23 PROCEDURE — G8427 DOCREV CUR MEDS BY ELIG CLIN: HCPCS | Performed by: NURSE PRACTITIONER

## 2022-06-23 PROCEDURE — 4004F PT TOBACCO SCREEN RCVD TLK: CPT | Performed by: NURSE PRACTITIONER

## 2022-06-23 PROCEDURE — G8417 CALC BMI ABV UP PARAM F/U: HCPCS | Performed by: NURSE PRACTITIONER

## 2022-06-23 PROCEDURE — 83036 HEMOGLOBIN GLYCOSYLATED A1C: CPT | Performed by: NURSE PRACTITIONER

## 2022-06-23 RX ORDER — IBUPROFEN 800 MG/1
800 TABLET ORAL
Qty: 90 TABLET | Refills: 0 | Status: SHIPPED | OUTPATIENT
Start: 2022-06-23 | End: 2022-06-23 | Stop reason: ALTCHOICE

## 2022-06-23 RX ORDER — PREDNISONE 20 MG/1
20 TABLET ORAL DAILY
Qty: 5 TABLET | Refills: 0 | Status: SHIPPED | OUTPATIENT
Start: 2022-06-23 | End: 2022-06-28

## 2022-06-23 RX ORDER — METHOCARBAMOL 500 MG/1
500 TABLET, FILM COATED ORAL 3 TIMES DAILY
Qty: 30 TABLET | Refills: 0 | Status: SHIPPED | OUTPATIENT
Start: 2022-06-23 | End: 2022-07-03

## 2022-06-23 ASSESSMENT — ENCOUNTER SYMPTOMS
COUGH: 0
DIARRHEA: 0
CHEST TIGHTNESS: 0
STRIDOR: 0
BLOOD IN STOOL: 0
BACK PAIN: 1
ABDOMINAL DISTENTION: 0
BOWEL INCONTINENCE: 0
VOMITING: 0

## 2022-06-23 ASSESSMENT — PATIENT HEALTH QUESTIONNAIRE - PHQ9
2. FEELING DOWN, DEPRESSED OR HOPELESS: 0
SUM OF ALL RESPONSES TO PHQ QUESTIONS 1-9: 0
1. LITTLE INTEREST OR PLEASURE IN DOING THINGS: 0
SUM OF ALL RESPONSES TO PHQ QUESTIONS 1-9: 0
SUM OF ALL RESPONSES TO PHQ QUESTIONS 1-9: 0
SUM OF ALL RESPONSES TO PHQ9 QUESTIONS 1 & 2: 0
SUM OF ALL RESPONSES TO PHQ QUESTIONS 1-9: 0

## 2022-06-23 NOTE — PATIENT INSTRUCTIONS
Muscle relaxer can cause drowsiness- No driving or drinking  Take Prednisone as prescribed  Do not take Ibuprofen  Schedule appointment with PCP within 2 weeks
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
## 2022-08-11 RX ORDER — AMLODIPINE BESYLATE 10 MG/1
TABLET ORAL
Qty: 90 TABLET | Refills: 0 | Status: SHIPPED | OUTPATIENT
Start: 2022-08-11 | End: 2022-10-14 | Stop reason: SDUPTHER

## 2022-08-11 RX ORDER — METOPROLOL SUCCINATE 100 MG/1
100 TABLET, EXTENDED RELEASE ORAL DAILY
Qty: 90 TABLET | Refills: 0 | Status: SHIPPED | OUTPATIENT
Start: 2022-08-11 | End: 2022-08-16 | Stop reason: SDUPTHER

## 2022-08-15 NOTE — TELEPHONE ENCOUNTER
Medication:   Requested Prescriptions     Pending Prescriptions Disp Refills    metoprolol succinate (TOPROL XL) 100 MG extended release tablet 90 tablet 0     Sig: Take 1 tablet by mouth in the morning. Last Filled:      Patient Phone Number: [phone] (home)     Last appt: 6/23/2022   Next appt: Visit date not found    Last OARRS: No flowsheet data found.

## 2022-08-16 RX ORDER — METOPROLOL SUCCINATE 100 MG/1
100 TABLET, EXTENDED RELEASE ORAL DAILY
Qty: 90 TABLET | Refills: 0 | Status: ON HOLD | OUTPATIENT
Start: 2022-08-16 | End: 2022-10-21 | Stop reason: HOSPADM

## 2022-10-13 ENCOUNTER — NURSE TRIAGE (OUTPATIENT)
Dept: OTHER | Facility: CLINIC | Age: 47
End: 2022-10-13

## 2022-10-13 NOTE — TELEPHONE ENCOUNTER
[facility] call from Divya Riddle at [place] with [employer]. Subjective: Caller states \"fluid in legs\"     Current Symptoms: see above, both of thighs are swollen, can't bend well, sob with exertion    Onset: around 1 week ago; unchanged    Associated Symptoms: reduced activity, constipation    Pain Severity: 8/10; sharp, numbness; constant    Temperature: denies fever     What has been tried: nothing    LMP: NA Pregnant: NA    Recommended disposition: See in Office Today    Care advice provided, patient verbalizes understanding; denies any other questions or concerns; instructed to call back for any new or worsening symptoms. Patient/Caller agrees with recommended disposition; writer provided warm transfer to [facility] at [place] for appointment scheduling    Attention Provider: Thank you for allowing me to participate in the care of your patient. The patient was connected to triage in response to information provided to the ECC/PSC. Please do not respond through this encounter as the response is not directed to a shared pool.          Reason for Disposition   Difficulty breathing with exertion AND worsening or new-onset    Protocols used: Leg Swelling and Edema-ADULT-[place]

## 2022-10-14 ENCOUNTER — TELEPHONE (OUTPATIENT)
Dept: PRIMARY CARE CLINIC | Age: 47
End: 2022-10-14

## 2022-10-14 ENCOUNTER — HOSPITAL ENCOUNTER (INPATIENT)
Age: 47
LOS: 6 days | Discharge: HOME | DRG: 286 | End: 2022-10-21
Attending: STUDENT IN AN ORGANIZED HEALTH CARE EDUCATION/TRAINING PROGRAM | Admitting: INTERNAL MEDICINE

## 2022-10-14 ENCOUNTER — OFFICE VISIT (OUTPATIENT)
Dept: PRIMARY CARE CLINIC | Age: 47
End: 2022-10-14
Payer: COMMERCIAL

## 2022-10-14 VITALS
SYSTOLIC BLOOD PRESSURE: 162 MMHG | OXYGEN SATURATION: 98 % | TEMPERATURE: 99 F | WEIGHT: 270 LBS | BODY MASS INDEX: 37.66 KG/M2 | HEART RATE: 74 BPM | DIASTOLIC BLOOD PRESSURE: 104 MMHG

## 2022-10-14 DIAGNOSIS — N18.5 STAGE 5 CHRONIC KIDNEY DISEASE NOT ON CHRONIC DIALYSIS (HCC): ICD-10-CM

## 2022-10-14 DIAGNOSIS — D64.9 ANEMIA, UNSPECIFIED TYPE: ICD-10-CM

## 2022-10-14 DIAGNOSIS — R60.0 EDEMA OF BOTH LEGS: ICD-10-CM

## 2022-10-14 DIAGNOSIS — R06.02 SOB (SHORTNESS OF BREATH): ICD-10-CM

## 2022-10-14 DIAGNOSIS — N18.6 ESRD (END STAGE RENAL DISEASE) (HCC): ICD-10-CM

## 2022-10-14 DIAGNOSIS — Z91.199 POOR COMPLIANCE: ICD-10-CM

## 2022-10-14 DIAGNOSIS — N18.5 STAGE 5 CHRONIC KIDNEY DISEASE NOT ON CHRONIC DIALYSIS (HCC): Primary | ICD-10-CM

## 2022-10-14 DIAGNOSIS — M79.89 LEG SWELLING: ICD-10-CM

## 2022-10-14 DIAGNOSIS — N17.9 ACUTE RENAL FAILURE, UNSPECIFIED ACUTE RENAL FAILURE TYPE (HCC): Primary | ICD-10-CM

## 2022-10-14 DIAGNOSIS — R06.02 SHORTNESS OF BREATH: ICD-10-CM

## 2022-10-14 DIAGNOSIS — I10 ESSENTIAL HYPERTENSION: ICD-10-CM

## 2022-10-14 LAB
BASOPHILS ABSOLUTE: 0.1 K/UL (ref 0–0.2)
BASOPHILS RELATIVE PERCENT: 1.6 %
EOSINOPHILS ABSOLUTE: 0.2 K/UL (ref 0–0.6)
EOSINOPHILS RELATIVE PERCENT: 2.9 %
HCT VFR BLD CALC: 17.5 % (ref 40.5–52.5)
HEMOGLOBIN: 5.7 G/DL (ref 13.5–17.5)
LYMPHOCYTES ABSOLUTE: 1 K/UL (ref 1–5.1)
LYMPHOCYTES RELATIVE PERCENT: 11.5 %
MCH RBC QN AUTO: 29.2 PG (ref 26–34)
MCHC RBC AUTO-ENTMCNC: 32.5 G/DL (ref 31–36)
MCV RBC AUTO: 89.9 FL (ref 80–100)
MONOCYTES ABSOLUTE: 0.7 K/UL (ref 0–1.3)
MONOCYTES RELATIVE PERCENT: 8.3 %
NEUTROPHILS ABSOLUTE: 6.3 K/UL (ref 1.7–7.7)
NEUTROPHILS RELATIVE PERCENT: 75.7 %
PARATHYROID HORMONE INTACT: 796.5 PG/ML (ref 14–72)
PDW BLD-RTO: 15.5 % (ref 12.4–15.4)
PLATELET # BLD: 273 K/UL (ref 135–450)
PMV BLD AUTO: 8.4 FL (ref 5–10.5)
POC OCCULT BLOOD STOOL: POSITIVE
RBC # BLD: 1.95 M/UL (ref 4.2–5.9)
SEDIMENTATION RATE, ERYTHROCYTE: 27 MM/HR (ref 0–15)
WBC # BLD: 8.4 K/UL (ref 4–11)

## 2022-10-14 PROCEDURE — 4004F PT TOBACCO SCREEN RCVD TLK: CPT | Performed by: FAMILY MEDICINE

## 2022-10-14 PROCEDURE — 96365 THER/PROPH/DIAG IV INF INIT: CPT

## 2022-10-14 PROCEDURE — G8484 FLU IMMUNIZE NO ADMIN: HCPCS | Performed by: FAMILY MEDICINE

## 2022-10-14 PROCEDURE — 93005 ELECTROCARDIOGRAM TRACING: CPT | Performed by: INTERNAL MEDICINE

## 2022-10-14 PROCEDURE — 99285 EMERGENCY DEPT VISIT HI MDM: CPT

## 2022-10-14 PROCEDURE — 82272 OCCULT BLD FECES 1-3 TESTS: CPT

## 2022-10-14 PROCEDURE — G8417 CALC BMI ABV UP PARAM F/U: HCPCS | Performed by: FAMILY MEDICINE

## 2022-10-14 PROCEDURE — 99214 OFFICE O/P EST MOD 30 MIN: CPT | Performed by: FAMILY MEDICINE

## 2022-10-14 PROCEDURE — 96375 TX/PRO/DX INJ NEW DRUG ADDON: CPT

## 2022-10-14 PROCEDURE — G8427 DOCREV CUR MEDS BY ELIG CLIN: HCPCS | Performed by: FAMILY MEDICINE

## 2022-10-14 PROCEDURE — 36415 COLL VENOUS BLD VENIPUNCTURE: CPT

## 2022-10-14 RX ORDER — HYDRALAZINE HYDROCHLORIDE 100 MG/1
100 TABLET, FILM COATED ORAL 2 TIMES DAILY
Qty: 180 TABLET | Refills: 1 | Status: ON HOLD | OUTPATIENT
Start: 2022-10-14 | End: 2022-10-21 | Stop reason: HOSPADM

## 2022-10-14 RX ORDER — PANTOPRAZOLE SODIUM 40 MG/10ML
80 INJECTION, POWDER, LYOPHILIZED, FOR SOLUTION INTRAVENOUS ONCE
Status: COMPLETED | OUTPATIENT
Start: 2022-10-15 | End: 2022-10-15

## 2022-10-14 RX ORDER — FUROSEMIDE 40 MG/1
40 TABLET ORAL DAILY
Qty: 60 TABLET | Refills: 3 | Status: ON HOLD | OUTPATIENT
Start: 2022-10-14 | End: 2022-10-21 | Stop reason: HOSPADM

## 2022-10-14 RX ORDER — AMLODIPINE BESYLATE 5 MG/1
5 TABLET ORAL DAILY
Qty: 90 TABLET | Refills: 1 | Status: ON HOLD | OUTPATIENT
Start: 2022-10-14 | End: 2022-10-21 | Stop reason: HOSPADM

## 2022-10-14 ASSESSMENT — ENCOUNTER SYMPTOMS
ABDOMINAL PAIN: 0
BLOOD IN STOOL: 0
VOICE CHANGE: 0
SHORTNESS OF BREATH: 1
TROUBLE SWALLOWING: 0
COUGH: 0
CONSTIPATION: 0
DIARRHEA: 0

## 2022-10-14 NOTE — LETTER
[address]  [address] [place] [address]  Phone: [phone]             October 21, 2022    Patient: Becca Michel   YOB: 1975   Date of Visit: 10/14/2022       To Whom It May Concern:    Becca Michel was seen and treated in our facility  beginning 10/14/2022 until 10/21/2022. He can return to work on 10/25/2022. Restrictions: no lifting, no heavy duty.       Sincerely,       Isabella Calvo RN         Signature:__________________________________

## 2022-10-14 NOTE — PROGRESS NOTES
10/14/2022     Joe Moreno (:  1975) is a 55 y.o. male, here for evaluation of the following medical concerns:    Shortness of Breath  Associated symptoms include leg swelling. Pertinent negatives include no abdominal pain, chest pain or rash. Patient is 55years old black male patient history of hypertension and chronic kidney disease stage V poorly compliant with medication. Many times in the past he was asked to see a nephrologist for further evaluation and treatment of his chronic kidney disease but patient never made an appointment. His hypertension is still poorly controlled, takes amlodipine 10 mg daily and Toprol- mg daily. He presented to the office today due to progressive shortness of breath with minimal activity associated with bilateral leg edema. He denies chest pain. Weight 20 to 30 pounds in past few months though his weight has been fluctuating for many months. Review of Systems   Constitutional:  Positive for fatigue and unexpected weight change. Negative for activity change. HENT:  Negative for trouble swallowing and voice change. Eyes:  Negative for visual disturbance. Respiratory:  Positive for shortness of breath. Negative for cough. Cardiovascular:  Positive for leg swelling. Negative for chest pain. Gastrointestinal:  Negative for abdominal pain, blood in stool, constipation and diarrhea. Genitourinary:  Negative for difficulty urinating, dysuria, frequency, hematuria and scrotal swelling. Musculoskeletal:  Negative for arthralgias and myalgias. Skin:  Negative for rash. Neurological:  Negative for dizziness. Psychiatric/Behavioral:  Negative for behavioral problems. Prior to Visit Medications    Medication Sig Taking?  Authorizing Provider   furosemide (LASIX) 40 MG tablet Take 1 tablet by mouth daily Yes Diana Ramos MD   hydrALAZINE (APRESOLINE) 100 MG tablet Take 1 tablet by mouth 2 times daily Yes Diana Ramos MD amLODIPine (NORVASC) 5 MG tablet Take 1 tablet by mouth daily Yes Veena Limon MD   metoprolol succinate (TOPROL XL) 100 MG extended release tablet Take 1 tablet by mouth in the morning. Yes Veena Limon MD        Social History     Tobacco Use    Smoking status: Some Days     Types: Cigars    Smokeless tobacco: Never    Tobacco comments:      2 a week   Substance Use Topics    Alcohol use: No        Vitals:    10/14/22 1145 10/14/22 1150   BP: (!) 162/95 (!) 162/104   Pulse: 74    Temp: 99 °F (37.2 °C)    TempSrc: Temporal    SpO2: 98%    Weight: 270 lb (122.5 kg)      Estimated body mass index is 37.66 kg/m² as calculated from the following:    Height as of 6/23/22: 5' 11\" (1.803 m). Weight as of this encounter: 270 lb (122.5 kg). Physical Exam  Constitutional:       Appearance: He is well-developed. HENT:      Head: Normocephalic. Eyes:      Conjunctiva/sclera: Conjunctivae normal.      Pupils: Pupils are equal, round, and reactive to light. Neck:      Thyroid: No thyromegaly. Cardiovascular:      Rate and Rhythm: Normal rate and regular rhythm. Heart sounds: Normal heart sounds. No murmur heard. No gallop. Pulmonary:      Effort: Pulmonary effort is normal.      Breath sounds: Normal breath sounds. Abdominal:      General: Bowel sounds are normal.      Palpations: Abdomen is soft. There is no mass. Genitourinary:     Penis: Normal.       Prostate: Normal.   Musculoskeletal:         General: Normal range of motion. Cervical back: Normal range of motion and neck supple. Right lower leg: Edema present. Left lower leg: Edema present. Skin:     General: Skin is warm. Findings: No rash. Neurological:      Mental Status: He is alert. Psychiatric:         Behavior: Behavior normal.       ASSESSMENT/PLAN:  1. Stage 5 chronic kidney disease not on chronic dialysis ([facility] [address]  He is very poorly compliant with medication.   Chronic kidney disease likely related to poorly controlled hypertension. Will recheck renal profile to evaluate current status of kidney disease but I suspect he may need to be on dialysis very soon. Will refer to nephrologist Dr. Donis Jackson  - External Referral To Nephrology  - ECHO Complete 2D W Doppler W Color  - CBC with Auto Differential; Future  - Sedimentation Rate; Future  - Comprehensive Metabolic Panel; Future  - Lipid Panel; Future  - PSA Screening; Future  - TSH; Future  - Uric Acid; Future  - Vitamin D 25 Hydroxy  - PTH, Intact; Future    2. Edema of both legs / 3. SOB (shortness of breath  Will order echocardiogram to evaluate LV function to rule out congestive heart failure. It could also be due to fluid retention due to underlying chronic kidney disease and will refer  to nephrologist.  Meantime will start Lasix 40 mg daily  - External Referral To Nephrology  - furosemide (LASIX) 40 MG tablet; Take 1 tablet by mouth daily  Dispense: 60 tablet; Refill: 3  - ECHO Complete 2D W Doppler W Color      4. Essential hypertension  Due to underlying leg edema will reduce amlodipine 10 mg to 5 mg daily. Blood pressure is still poorly controlled so will add hydralazine 100 mg twice daily. Continue Toprol- mg daily  - hydrALAZINE (APRESOLINE) 100 MG tablet; Take 1 tablet by mouth 2 times daily  Dispense: 180 tablet; Refill: 1  - amLODIPine (NORVASC) 5 MG tablet; Take 1 tablet by mouth daily  Dispense: 90 tablet; Refill: 1    5.  Poor compliance    RTC in 1 month    An electronic signature was used to authenticate this note.    --Kyleigh Meek MD on 10/14/2022 at 12:17 PM

## 2022-10-14 NOTE — Clinical Note
Discharge Plan[de-identified] Other/[facility])   Telemetry/Cardiac Monitoring Required?: Yes

## 2022-10-15 ENCOUNTER — APPOINTMENT (OUTPATIENT)
Dept: GENERAL RADIOLOGY | Age: 47
DRG: 286 | End: 2022-10-15

## 2022-10-15 PROBLEM — N17.9 ACUTE RENAL FAILURE (ARF) (HCC): Status: ACTIVE | Noted: 2022-10-15

## 2022-10-15 PROBLEM — N18.6 ESRD (END STAGE RENAL DISEASE) (HCC): Status: ACTIVE | Noted: 2022-10-15

## 2022-10-15 LAB
A/G RATIO: 1.3 (ref 1.1–2.2)
ABO/RH: NORMAL
ALBUMIN SERPL-MCNC: 3.7 G/DL (ref 3.4–5)
ALBUMIN SERPL-MCNC: 3.7 G/DL (ref 3.4–5)
ALP BLD-CCNC: 83 U/L (ref 40–129)
ALP BLD-CCNC: 84 U/L (ref 40–129)
ALT SERPL-CCNC: 16 U/L (ref 10–40)
ALT SERPL-CCNC: 17 U/L (ref 10–40)
ANION GAP SERPL CALCULATED.3IONS-SCNC: 22 MMOL/L (ref 3–16)
ANION GAP SERPL CALCULATED.3IONS-SCNC: 24 MMOL/L (ref 3–16)
ANTIBODY SCREEN: NORMAL
AST SERPL-CCNC: 13 U/L (ref 15–37)
AST SERPL-CCNC: 13 U/L (ref 15–37)
BASOPHILS ABSOLUTE: 0.1 K/UL (ref 0–0.2)
BASOPHILS RELATIVE PERCENT: 1.5 %
BILIRUB SERPL-MCNC: 0.3 MG/DL (ref 0–1)
BILIRUB SERPL-MCNC: <0.2 MG/DL (ref 0–1)
BILIRUBIN DIRECT: <0.2 MG/DL (ref 0–0.3)
BILIRUBIN URINE: NEGATIVE
BILIRUBIN, INDIRECT: ABNORMAL MG/DL (ref 0–1)
BLOOD BANK DISPENSE STATUS: NORMAL
BLOOD BANK PRODUCT CODE: NORMAL
BLOOD, URINE: ABNORMAL
BPU ID: NORMAL
BUN BLDV-MCNC: 110 MG/DL (ref 7–20)
BUN BLDV-MCNC: 117 MG/DL (ref 7–20)
CALCIUM SERPL-MCNC: 8 MG/DL (ref 8.3–10.6)
CALCIUM SERPL-MCNC: 8.3 MG/DL (ref 8.3–10.6)
CHLORIDE BLD-SCNC: 100 MMOL/L (ref 99–110)
CHLORIDE BLD-SCNC: 98 MMOL/L (ref 99–110)
CHOLESTEROL, TOTAL: 134 MG/DL (ref 0–199)
CLARITY: CLEAR
CO2: 18 MMOL/L (ref 21–32)
CO2: 18 MMOL/L (ref 21–32)
COLOR: YELLOW
CREAT SERPL-MCNC: 13.8 MG/DL (ref 0.9–1.3)
CREAT SERPL-MCNC: 14 MG/DL (ref 0.9–1.3)
DESCRIPTION BLOOD BANK: NORMAL
EKG ATRIAL RATE: 82 BPM
EKG DIAGNOSIS: NORMAL
EKG P AXIS: 46 DEGREES
EKG P-R INTERVAL: 184 MS
EKG Q-T INTERVAL: 428 MS
EKG QRS DURATION: 104 MS
EKG QTC CALCULATION (BAZETT): 500 MS
EKG R AXIS: 3 DEGREES
EKG T AXIS: 109 DEGREES
EKG VENTRICULAR RATE: 82 BPM
EOSINOPHILS ABSOLUTE: 0.3 K/UL (ref 0–0.6)
EOSINOPHILS RELATIVE PERCENT: 3.1 %
EPITHELIAL CELLS, UA: ABNORMAL /HPF (ref 0–5)
FERRITIN: 130.2 NG/ML (ref 30–400)
GFR AFRICAN AMERICAN: 5
GFR AFRICAN AMERICAN: 5
GFR NON-AFRICAN AMERICAN: 4
GFR NON-AFRICAN AMERICAN: 4
GLUCOSE BLD-MCNC: 115 MG/DL (ref 70–99)
GLUCOSE BLD-MCNC: 116 MG/DL (ref 70–99)
GLUCOSE URINE: NEGATIVE MG/DL
HCT VFR BLD CALC: 16.9 % (ref 40.5–52.5)
HCT VFR BLD CALC: 17.4 % (ref 40.5–52.5)
HCT VFR BLD CALC: 17.4 % (ref 40.5–52.5)
HCT VFR BLD CALC: 20.4 % (ref 40.5–52.5)
HCT VFR BLD CALC: 23.3 % (ref 40.5–52.5)
HCT VFR BLD CALC: 23.5 % (ref 40.5–52.5)
HDLC SERPL-MCNC: 38 MG/DL (ref 40–60)
HEMOGLOBIN: 5.6 G/DL (ref 13.5–17.5)
HEMOGLOBIN: 5.7 G/DL (ref 13.5–17.5)
HEMOGLOBIN: 5.7 G/DL (ref 13.5–17.5)
HEMOGLOBIN: 6.6 G/DL (ref 13.5–17.5)
HEMOGLOBIN: 8.1 G/DL (ref 13.5–17.5)
HYALINE CASTS: ABNORMAL /LPF (ref 0–2)
IMMATURE RETIC FRACT: 0.55 (ref 0.21–0.37)
INR BLD: 1.29 (ref 0.87–1.14)
IRON SATURATION: 7 % (ref 20–50)
IRON: 18 UG/DL (ref 59–158)
KETONES, URINE: NEGATIVE MG/DL
LACTATE DEHYDROGENASE: 201 U/L (ref 100–190)
LDL CHOLESTEROL CALCULATED: 76 MG/DL
LEUKOCYTE ESTERASE, URINE: NEGATIVE
LIPASE: 26 U/L (ref 13–60)
LV EF: 43 %
LVEF MODALITY: NORMAL
LYMPHOCYTES ABSOLUTE: 1.2 K/UL (ref 1–5.1)
LYMPHOCYTES RELATIVE PERCENT: 12.5 %
MAGNESIUM: 2.8 MG/DL (ref 1.8–2.4)
MCH RBC QN AUTO: 29 PG (ref 26–34)
MCHC RBC AUTO-ENTMCNC: 32.7 G/DL (ref 31–36)
MCV RBC AUTO: 88.7 FL (ref 80–100)
MICROSCOPIC EXAMINATION: YES
MONOCYTES ABSOLUTE: 0.8 K/UL (ref 0–1.3)
MONOCYTES RELATIVE PERCENT: 8.2 %
NEUTROPHILS ABSOLUTE: 7.4 K/UL (ref 1.7–7.7)
NEUTROPHILS RELATIVE PERCENT: 74.7 %
NITRITE, URINE: NEGATIVE
PDW BLD-RTO: 15.2 % (ref 12.4–15.4)
PH UA: 6 (ref 5–8)
PHOSPHORUS: 8.6 MG/DL (ref 2.5–4.9)
PLATELET # BLD: 281 K/UL (ref 135–450)
PMV BLD AUTO: 8.4 FL (ref 5–10.5)
POTASSIUM REFLEX MAGNESIUM: 3.6 MMOL/L (ref 3.5–5.1)
POTASSIUM SERPL-SCNC: 4.1 MMOL/L (ref 3.5–5.1)
PRO-BNP: ABNORMAL PG/ML (ref 0–124)
PROSTATE SPECIFIC ANTIGEN: 0.43 NG/ML (ref 0–4)
PROTEIN UA: 100 MG/DL
PROTHROMBIN TIME: 16 SEC (ref 11.7–14.5)
RBC # BLD: 1.96 M/UL (ref 4.2–5.9)
RBC UA: ABNORMAL /HPF (ref 0–4)
RETICULOCYTE ABSOLUTE COUNT: 0.07 M/UL
RETICULOCYTE COUNT PCT: 2.48 % (ref 0.5–2.18)
SODIUM BLD-SCNC: 138 MMOL/L (ref 136–145)
SODIUM BLD-SCNC: 142 MMOL/L (ref 136–145)
SPECIFIC GRAVITY UA: 1.02 (ref 1–1.03)
T4 FREE: 0.9 NG/DL (ref 0.9–1.8)
TOTAL IRON BINDING CAPACITY: 242 UG/DL (ref 260–445)
TOTAL PROTEIN: 6.5 G/DL (ref 6.4–8.2)
TOTAL PROTEIN: 6.7 G/DL (ref 6.4–8.2)
TRIGL SERPL-MCNC: 102 MG/DL (ref 0–150)
TROPONIN: 0.06 NG/ML
TSH SERPL DL<=0.05 MIU/L-ACNC: 1.53 UIU/ML (ref 0.27–4.2)
TSH SERPL DL<=0.05 MIU/L-ACNC: 1.75 UIU/ML (ref 0.27–4.2)
URIC ACID, SERUM: 10.6 MG/DL (ref 3.5–7.2)
URINE REFLEX TO CULTURE: YES
URINE TYPE: ABNORMAL
UROBILINOGEN, URINE: 0.2 E.U./DL
VLDLC SERPL CALC-MCNC: 20 MG/DL
WBC # BLD: 9.9 K/UL (ref 4–11)
WBC UA: ABNORMAL /HPF (ref 0–5)

## 2022-10-15 PROCEDURE — 83615 LACTATE (LD) (LDH) ENZYME: CPT

## 2022-10-15 PROCEDURE — 83880 ASSAY OF NATRIURETIC PEPTIDE: CPT

## 2022-10-15 PROCEDURE — 83540 ASSAY OF IRON: CPT

## 2022-10-15 PROCEDURE — 71045 X-RAY EXAM CHEST 1 VIEW: CPT

## 2022-10-15 PROCEDURE — 36430 TRANSFUSION BLD/BLD COMPNT: CPT

## 2022-10-15 PROCEDURE — 84484 ASSAY OF TROPONIN QUANT: CPT

## 2022-10-15 PROCEDURE — 6370000000 HC RX 637 (ALT 250 FOR IP)

## 2022-10-15 PROCEDURE — 2580000003 HC RX 258: Performed by: STUDENT IN AN ORGANIZED HEALTH CARE EDUCATION/TRAINING PROGRAM

## 2022-10-15 PROCEDURE — 81001 URINALYSIS AUTO W/SCOPE: CPT

## 2022-10-15 PROCEDURE — 87086 URINE CULTURE/COLONY COUNT: CPT

## 2022-10-15 PROCEDURE — 94761 N-INVAS EAR/PLS OXIMETRY MLT: CPT

## 2022-10-15 PROCEDURE — 36556 INSERT NON-TUNNEL CV CATH: CPT | Performed by: SURGERY

## 2022-10-15 PROCEDURE — 84443 ASSAY THYROID STIM HORMONE: CPT

## 2022-10-15 PROCEDURE — 90935 HEMODIALYSIS ONE EVALUATION: CPT

## 2022-10-15 PROCEDURE — 84439 ASSAY OF FREE THYROXINE: CPT

## 2022-10-15 PROCEDURE — 82728 ASSAY OF FERRITIN: CPT

## 2022-10-15 PROCEDURE — 86923 COMPATIBILITY TEST ELECTRIC: CPT

## 2022-10-15 PROCEDURE — 83550 IRON BINDING TEST: CPT

## 2022-10-15 PROCEDURE — 83735 ASSAY OF MAGNESIUM: CPT

## 2022-10-15 PROCEDURE — 86706 HEP B SURFACE ANTIBODY: CPT

## 2022-10-15 PROCEDURE — 86901 BLOOD TYPING SEROLOGIC RH(D): CPT

## 2022-10-15 PROCEDURE — 2500000003 HC RX 250 WO HCPCS

## 2022-10-15 PROCEDURE — 99221 1ST HOSP IP/OBS SF/LOW 40: CPT | Performed by: SURGERY

## 2022-10-15 PROCEDURE — P9016 RBC LEUKOCYTES REDUCED: HCPCS

## 2022-10-15 PROCEDURE — 86850 RBC ANTIBODY SCREEN: CPT

## 2022-10-15 PROCEDURE — 6370000000 HC RX 637 (ALT 250 FOR IP): Performed by: STUDENT IN AN ORGANIZED HEALTH CARE EDUCATION/TRAINING PROGRAM

## 2022-10-15 PROCEDURE — 80076 HEPATIC FUNCTION PANEL: CPT

## 2022-10-15 PROCEDURE — 83010 ASSAY OF HAPTOGLOBIN QUANT: CPT

## 2022-10-15 PROCEDURE — 85025 COMPLETE CBC W/AUTO DIFF WBC: CPT

## 2022-10-15 PROCEDURE — 87340 HEPATITIS B SURFACE AG IA: CPT

## 2022-10-15 PROCEDURE — C8929 TTE W OR WO FOL WCON,DOPPLER: HCPCS

## 2022-10-15 PROCEDURE — 85045 AUTOMATED RETICULOCYTE COUNT: CPT

## 2022-10-15 PROCEDURE — 5A1D70Z PERFORMANCE OF URINARY FILTRATION, INTERMITTENT, LESS THAN 6 HOURS PER DAY: ICD-10-PCS | Performed by: STUDENT IN AN ORGANIZED HEALTH CARE EDUCATION/TRAINING PROGRAM

## 2022-10-15 PROCEDURE — 84100 ASSAY OF PHOSPHORUS: CPT

## 2022-10-15 PROCEDURE — 86900 BLOOD TYPING SEROLOGIC ABO: CPT

## 2022-10-15 PROCEDURE — 2060000000 HC ICU INTERMEDIATE R&B

## 2022-10-15 PROCEDURE — C9113 INJ PANTOPRAZOLE SODIUM, VIA: HCPCS | Performed by: STUDENT IN AN ORGANIZED HEALTH CARE EDUCATION/TRAINING PROGRAM

## 2022-10-15 PROCEDURE — 85018 HEMOGLOBIN: CPT

## 2022-10-15 PROCEDURE — 2580000003 HC RX 258

## 2022-10-15 PROCEDURE — 6360000002 HC RX W HCPCS: Performed by: STUDENT IN AN ORGANIZED HEALTH CARE EDUCATION/TRAINING PROGRAM

## 2022-10-15 PROCEDURE — 86704 HEP B CORE ANTIBODY TOTAL: CPT

## 2022-10-15 PROCEDURE — 83690 ASSAY OF LIPASE: CPT

## 2022-10-15 PROCEDURE — 85610 PROTHROMBIN TIME: CPT

## 2022-10-15 PROCEDURE — 85014 HEMATOCRIT: CPT

## 2022-10-15 PROCEDURE — 80048 BASIC METABOLIC PNL TOTAL CA: CPT

## 2022-10-15 PROCEDURE — 6360000002 HC RX W HCPCS

## 2022-10-15 PROCEDURE — 36415 COLL VENOUS BLD VENIPUNCTURE: CPT

## 2022-10-15 PROCEDURE — 71046 X-RAY EXAM CHEST 2 VIEWS: CPT

## 2022-10-15 PROCEDURE — 02HV33Z INSERTION OF INFUSION DEVICE INTO SUPERIOR VENA CAVA, PERCUTANEOUS APPROACH: ICD-10-PCS

## 2022-10-15 PROCEDURE — 76942 ECHO GUIDE FOR BIOPSY: CPT | Performed by: SURGERY

## 2022-10-15 RX ORDER — ACETAMINOPHEN 325 MG/1
650 TABLET ORAL EVERY 6 HOURS PRN
Status: DISCONTINUED | OUTPATIENT
Start: 2022-10-15 | End: 2022-10-19

## 2022-10-15 RX ORDER — FUROSEMIDE 10 MG/ML
100 INJECTION INTRAMUSCULAR; INTRAVENOUS ONCE
Status: COMPLETED | OUTPATIENT
Start: 2022-10-15 | End: 2022-10-15

## 2022-10-15 RX ORDER — AMLODIPINE BESYLATE 10 MG/1
10 TABLET ORAL DAILY
Status: DISCONTINUED | OUTPATIENT
Start: 2022-10-16 | End: 2022-10-18

## 2022-10-15 RX ORDER — ACETAMINOPHEN 650 MG/1
650 SUPPOSITORY RECTAL EVERY 6 HOURS PRN
Status: DISCONTINUED | OUTPATIENT
Start: 2022-10-15 | End: 2022-10-19

## 2022-10-15 RX ORDER — HYDRALAZINE HYDROCHLORIDE 50 MG/1
50 TABLET, FILM COATED ORAL EVERY 8 HOURS SCHEDULED
Status: DISCONTINUED | OUTPATIENT
Start: 2022-10-16 | End: 2022-10-17

## 2022-10-15 RX ORDER — HEPARIN SODIUM 1000 [USP'U]/ML
5000 INJECTION, SOLUTION INTRAVENOUS; SUBCUTANEOUS ONCE
Status: COMPLETED | OUTPATIENT
Start: 2022-10-15 | End: 2022-10-15

## 2022-10-15 RX ORDER — CARVEDILOL 12.5 MG/1
12.5 TABLET ORAL 2 TIMES DAILY WITH MEALS
Status: DISCONTINUED | OUTPATIENT
Start: 2022-10-16 | End: 2022-10-18

## 2022-10-15 RX ORDER — SODIUM CHLORIDE 9 MG/ML
INJECTION, SOLUTION INTRAVENOUS PRN
Status: DISCONTINUED | OUTPATIENT
Start: 2022-10-15 | End: 2022-10-21 | Stop reason: HOSPADM

## 2022-10-15 RX ORDER — ETOMIDATE 2 MG/ML
INJECTION INTRAVENOUS
Status: DISCONTINUED
Start: 2022-10-15 | End: 2022-10-15

## 2022-10-15 RX ORDER — LABETALOL HYDROCHLORIDE 5 MG/ML
10 INJECTION, SOLUTION INTRAVENOUS ONCE
Status: COMPLETED | OUTPATIENT
Start: 2022-10-15 | End: 2022-10-15

## 2022-10-15 RX ORDER — PANTOPRAZOLE SODIUM 40 MG/1
40 TABLET, DELAYED RELEASE ORAL
Status: DISCONTINUED | OUTPATIENT
Start: 2022-10-15 | End: 2022-10-21 | Stop reason: HOSPADM

## 2022-10-15 RX ORDER — SODIUM CHLORIDE 0.9 % (FLUSH) 0.9 %
5-40 SYRINGE (ML) INJECTION EVERY 12 HOURS SCHEDULED
Status: DISCONTINUED | OUTPATIENT
Start: 2022-10-15 | End: 2022-10-21 | Stop reason: HOSPADM

## 2022-10-15 RX ORDER — METOPROLOL SUCCINATE 100 MG/1
100 TABLET, EXTENDED RELEASE ORAL DAILY
Status: DISCONTINUED | OUTPATIENT
Start: 2022-10-15 | End: 2022-10-15

## 2022-10-15 RX ORDER — HYDRALAZINE HYDROCHLORIDE 100 MG/1
100 TABLET, FILM COATED ORAL 2 TIMES DAILY
Status: DISCONTINUED | OUTPATIENT
Start: 2022-10-15 | End: 2022-10-15

## 2022-10-15 RX ORDER — SODIUM CHLORIDE 0.9 % (FLUSH) 0.9 %
5-40 SYRINGE (ML) INJECTION PRN
Status: DISCONTINUED | OUTPATIENT
Start: 2022-10-15 | End: 2022-10-21 | Stop reason: HOSPADM

## 2022-10-15 RX ORDER — HYDROXYZINE HYDROCHLORIDE 10 MG/1
10 TABLET, FILM COATED ORAL 3 TIMES DAILY PRN
Status: DISCONTINUED | OUTPATIENT
Start: 2022-10-15 | End: 2022-10-21 | Stop reason: HOSPADM

## 2022-10-15 RX ORDER — PROPOFOL 10 MG/ML
INJECTION, EMULSION INTRAVENOUS
Status: DISCONTINUED
Start: 2022-10-15 | End: 2022-10-15

## 2022-10-15 RX ORDER — TORSEMIDE 20 MG/1
60 TABLET ORAL DAILY
Status: DISCONTINUED | OUTPATIENT
Start: 2022-10-16 | End: 2022-10-16

## 2022-10-15 RX ORDER — AMLODIPINE BESYLATE 5 MG/1
5 TABLET ORAL DAILY
Status: DISCONTINUED | OUTPATIENT
Start: 2022-10-15 | End: 2022-10-15

## 2022-10-15 RX ORDER — ONDANSETRON 4 MG/1
4 TABLET, ORALLY DISINTEGRATING ORAL EVERY 8 HOURS PRN
Status: DISCONTINUED | OUTPATIENT
Start: 2022-10-15 | End: 2022-10-21 | Stop reason: HOSPADM

## 2022-10-15 RX ORDER — FUROSEMIDE 10 MG/ML
80 INJECTION INTRAMUSCULAR; INTRAVENOUS ONCE
Status: COMPLETED | OUTPATIENT
Start: 2022-10-15 | End: 2022-10-15

## 2022-10-15 RX ORDER — SODIUM CHLORIDE 9 MG/ML
INJECTION, SOLUTION INTRAVENOUS PRN
Status: DISCONTINUED | OUTPATIENT
Start: 2022-10-15 | End: 2022-10-15

## 2022-10-15 RX ORDER — ONDANSETRON 2 MG/ML
4 INJECTION INTRAMUSCULAR; INTRAVENOUS EVERY 6 HOURS PRN
Status: DISCONTINUED | OUTPATIENT
Start: 2022-10-15 | End: 2022-10-21 | Stop reason: HOSPADM

## 2022-10-15 RX ORDER — POLYETHYLENE GLYCOL 3350 17 G/17G
17 POWDER, FOR SOLUTION ORAL DAILY PRN
Status: DISCONTINUED | OUTPATIENT
Start: 2022-10-15 | End: 2022-10-21 | Stop reason: HOSPADM

## 2022-10-15 RX ORDER — FUROSEMIDE 40 MG/1
40 TABLET ORAL DAILY
Status: DISCONTINUED | OUTPATIENT
Start: 2022-10-15 | End: 2022-10-15

## 2022-10-15 RX ORDER — CALCITRIOL 0.25 UG/1
0.25 CAPSULE, LIQUID FILLED ORAL DAILY
Status: DISCONTINUED | OUTPATIENT
Start: 2022-10-15 | End: 2022-10-21 | Stop reason: HOSPADM

## 2022-10-15 RX ADMIN — FUROSEMIDE 80 MG: 10 INJECTION, SOLUTION INTRAMUSCULAR; INTRAVENOUS at 02:16

## 2022-10-15 RX ADMIN — CEFTRIAXONE 1000 MG: 1 INJECTION, POWDER, FOR SOLUTION INTRAMUSCULAR; INTRAVENOUS at 00:39

## 2022-10-15 RX ADMIN — CALCITRIOL CAPSULES 0.25 MCG 0.25 MCG: 0.25 CAPSULE ORAL at 12:00

## 2022-10-15 RX ADMIN — LABETALOL HYDROCHLORIDE 10 MG: 5 INJECTION, SOLUTION INTRAVENOUS at 05:43

## 2022-10-15 RX ADMIN — AMLODIPINE BESYLATE 5 MG: 5 TABLET ORAL at 08:41

## 2022-10-15 RX ADMIN — FUROSEMIDE 40 MG: 40 TABLET ORAL at 08:41

## 2022-10-15 RX ADMIN — FUROSEMIDE 100 MG: 10 INJECTION, SOLUTION INTRAMUSCULAR; INTRAVENOUS at 12:00

## 2022-10-15 RX ADMIN — METOPROLOL SUCCINATE 100 MG: 100 TABLET, EXTENDED RELEASE ORAL at 08:41

## 2022-10-15 RX ADMIN — SODIUM CHLORIDE, PRESERVATIVE FREE 10 ML: 5 INJECTION INTRAVENOUS at 21:03

## 2022-10-15 RX ADMIN — HYDRALAZINE HYDROCHLORIDE 100 MG: 100 TABLET, FILM COATED ORAL at 08:41

## 2022-10-15 RX ADMIN — PANTOPRAZOLE SODIUM 40 MG: 40 TABLET, DELAYED RELEASE ORAL at 16:04

## 2022-10-15 RX ADMIN — PANTOPRAZOLE SODIUM 80 MG: 40 INJECTION, POWDER, LYOPHILIZED, FOR SOLUTION INTRAVENOUS at 00:40

## 2022-10-15 RX ADMIN — SODIUM CHLORIDE, PRESERVATIVE FREE 10 ML: 5 INJECTION INTRAVENOUS at 08:39

## 2022-10-15 RX ADMIN — HEPARIN SODIUM 5000 UNITS: 1000 INJECTION INTRAVENOUS; SUBCUTANEOUS at 13:27

## 2022-10-15 RX ADMIN — PANTOPRAZOLE SODIUM 40 MG: 40 TABLET, DELAYED RELEASE ORAL at 07:10

## 2022-10-15 RX ADMIN — HYDRALAZINE HYDROCHLORIDE 100 MG: 100 TABLET, FILM COATED ORAL at 21:02

## 2022-10-15 NOTE — CONSULTS
Department of General Surgery  Surgical Service    Line Consultation    Reason for Consult: Access     HPI:  Ariela Marroquin is a 55 y.o. male recently admitted for Hgb of 5.7, b/l edema, and for elevated BUN and Cr. Patient requires Vasc cath/CVC  placement for urgent HD access. Therefore general surgery has been consulted for assistance with access. Previous central line attempts this admission: None  Central line attempt prior to this admission: None  Current Access: PIV x2  Anticoagulation: None  Antiplatelet:None  INR: 5.79  Platelet Count : 706  AV Access: none  Pacemaker:none  Port:no    Past Medical History:   has a past medical history of Anxiety, CKD (chronic kidney disease), Hyperlipidemia LDL goal <100, Hypertension, and Obesity (BMI 30.0-34.9). Past Surgical History:   has no past surgical history on file. Medications:  Prior to Admission medications    Medication Sig Start Date End Date Taking? Authorizing Provider   furosemide (LASIX) 40 MG tablet Take 1 tablet by mouth daily 10/14/22   Rafa Graves MD   hydrALAZINE (APRESOLINE) 100 MG tablet Take 1 tablet by mouth 2 times daily 10/14/22   Rafa Graves MD   amLODIPine (NORVASC) 5 MG tablet Take 1 tablet by mouth daily 10/14/22   Rafa Graves MD   metoprolol succinate (TOPROL XL) 100 MG extended release tablet Take 1 tablet by mouth in the morning. 8/16/22   Rafa Graves MD       Allergies:  Patient has no known allergies. Family History:  family history is not on file. Social History:   reports that he has been smoking cigarettes. He has never used smokeless tobacco. He reports that he does not drink alcohol and does not use drugs. REVIEW OF SYSTEMS:    A 5 point review of systems was completed. Pt also indicated unintentional weight gain of 40 lbs, increase in SOB, dark stools that have resolved.      PHYSICAL EXAM:    VITALS:  BP (!) 156/124   Pulse 63   Temp 97.7 °F (36.5 °C) (Oral)   Resp 12   Ht 6' 2\" (1.88 m)   Wt 264 lb 1.8 oz (119.8 kg)   SpO2 98%   BMI 33.91 kg/m²   24HR INTAKE/OUTPUT:    Intake/Output Summary (Last 24 hours) at 10/15/2022 1110  Last data filed at 10/15/2022 1000  Gross per 24 hour   Intake 940 ml   Output 1225 ml   Net -285 ml     URINARY CATHETER OUTPUT (Erickson):     TEMPERATURE:  Current - Temp: 97.7 °F (36.5 °C); Max - Temp  Av.8 °F (36.6 °C)  Min: 97.4 °F (36.3 °C)  Max: 99 °F (37.2 °C)  RESPIRATIONS RANGE: Resp  Av.7  Min: 12  Max: 25  PULSE RANGE: Pulse  Av.4  Min: 63  Max: 95  BLOOD PRESSURE RANGE:  Systolic (63XVV), JZH:903 , Min:151 , SASHA:868  ; Diastolic (15DUZ), CRC:053, Min:83, Max:132   PULSE OXIMETRY RANGE: SpO2  Av %  Min: 90 %  Max: 100 %    CONSTITUTIONAL awake, alert & oriented x 3, cooperative, no apparent distress,    HEENT   NCAT. No palpable lymphadenopathy, masses, or other lesion of the neck. Anicteric sclera     CHEST&LUNGS Slight increased work of breathing, on RA. No masses, lesions, or implants noted. CV Regular rate and rhythm, normal    ABDOMEN   Soft, non-tender, non-distended, no hernias or masses palpated   EXT B/l LE edema up to mid-proximal legs. DATA:    XR CHEST (2 VW)   Final Result      Gross cardiac enlargement. .      No acute traumatic consolidation. ECHO: Awaiting echo  CXR: completed 10/14, cardiomegaly. ASSESSMENT AND PLAN:    Eleazar Caldwell is a 55 y.o. male who has CKD stage V not previously treated with HD now in need of HD dialysis access. Patient requires central access for HD  Plan to attempt line placement of R IJ as pt has no lesions nor any previous line or surgical hx of the neck. Case discussed with Dr. Adenike Goldman.      Marrian Lesches, DO   PGY1, General Surgery  10/15/22  11:14 AM  Pager # [phone]

## 2022-10-15 NOTE — CONSULTS
Consult received. Labs and notes were reviewed. Case was discussed with the staff. Full note to follow.     Thanks  Nephrology  [facility] [address], [address] [place] [address]  Office: [phone]  Cell: [phone]  Fax: [phone]

## 2022-10-15 NOTE — TELEPHONE ENCOUNTER
Patient sent in from home due to Hb of 117.  55year old man with CKD, hypertension , hyperlipidemia, elevated glucose saw PCP today with fatigue , CRISTINA , and lab called with hemoglobin 5. 7.  wife will transport him to ER.

## 2022-10-15 NOTE — CONSULTS
Nephrology Consult Note        [facility]    [email or web address]       Phone: [phone]                                                  10/15/2022 7:19 AM     Patient: Em Norton [id number]  [id number]  Date of Admit: 10/14/2022 LOS: 0 days      Plan:  Start dialysis  Surgery team consulted to assist in dialysis catheter placement. Start Epogen after BP is better controlled. Minimal UF today as patient is on room air and first session  IV Lasix as patient makes urine  Getting Blood transfusion. Monitor BP and will adjust medications. Start Calcitriol  Consider GI evaluation as patient reported black stools  Avoid nephrotoxins  Monitor input, output and weight  Monitor labs and vitals closely  Renally dose all medications    Informed consent for dialysis obtained and placed in chart. HD orders placed and informed dialysis nurse. Thank you for allowing us to participate in this patient's care    In case of any question please call us at our 24 hour answering service [phone] or from 7 AM to 5 PM via [employer] or cell number    Nerissa Holder MD  [facility]  [facility]  [place], [address]  Fax: [phone]  Office: [phone]       Assessment & Plan     Renal function:    Chronic Kidney Disease 5/ESRD  Cr 13    Need to start HD      Electrolytes:  Lab Results   Component Value Date    CREATININE 13.8 (HH) 10/15/2022     (HH) 10/15/2022     10/15/2022    K 3.6 10/15/2022    CL 98 (L) 10/15/2022    CO2 18 (L) 10/15/2022            # CKD- MBD:   Secondary hyperparathyroidism due to renal failure  Monitor Phos level while here. Lab Results   Component Value Date    .5 (H) 10/14/2022    CALCIUM 8.0 (L) 10/15/2022         Acid/Base status:  Mild metabolic acidosis. Volume status/BP:  BP elevated. Volume overloaded with chronic edema but comfortable on room air.  No urgency for aggressive UF      Intake/Output Summary (Last 24 hours) at 10/15/2022 0719  Last data filed at 10/15/2022 0700  Gross per 24 hour   Intake 600 ml   Output 800 ml   Net -200 ml         Hematology:   CKD related anemia  Goal Hgb 10-11    Lab Results   Component Value Date    FERRITIN 130.2 10/15/2022     Lab Results   Component Value Date    WBC 9.9 10/15/2022    HGB 5.6 (LL) 10/15/2022    HCT 16.9 (LL) 10/15/2022    MCV 88.7 10/15/2022     10/15/2022             Reason for Consult and Chief Complaint     Reason for consult: ESRD    Chief complaint:   Chief Complaint   Patient presents with    Leg Swelling     C/o on going leg swelling for a \"few weeks\" saw PCP and sent to get blood work today    Anemia     PCP called tonight and sent to ED d/t blood count low, patient states told \"5.0\" patient states sob, and dark stools for few days        History of Present Illness   Jody Navarro is a 55 y.o. with PMH significant for CKD 5 was admitted with leg swelling, exertional SOB and low Hb. No chest pain. No SOB at rest. Patient also gaining weight. Patient reported some black stools. No focal weakness. Review of Systems   Positive in bold or unable to assess     GEN: Fever, chills, night sweats. HEENT: Changes in vision, sore throat, rhinorrhea   CVS: Chest pain, palpitations,swelling or edema in legs  Pulmonary: Cough, hemoptysis, SOB   GI: Nausea, vomiting, diarrhea, constipation, abdominal pain  : Bladder incontinence, dysuria,hematuria. MSK: Muscle or joint or bone pains  Skin: Rashes, ulcers, skin thickness  CNS: Headache, dizziness, confusion, focal weakness, seizure. Psych: Anxiety, agitation, depression. Reviewed all 12 systems, negative except as above.      Past Medical History     Past Medical History:   Diagnosis Date    Anxiety 3/28/2017    CKD (chronic kidney disease) 6/16/2017    Hyperlipidemia LDL goal <100 6/16/2017    Hypertension     Obesity (BMI 30.0-34.9) 6/16/2017         Past Surgical History     History reviewed. No pertinent surgical history. Family History     History reviewed. No pertinent family history. Social History     Social History     Tobacco Use    Smoking status: Some Days     Years: 4.00     Types: Cigarettes    Smokeless tobacco: Never    Tobacco comments:      2 a week   Substance Use Topics    Alcohol use: No          Past medical, family, and social histories were reviewed as previously documented. Updates were made as necessary. Inpatient Medications and Allergies       Scheduled Meds:   amLODIPine  5 mg Oral Daily    furosemide  40 mg Oral Daily    hydrALAZINE  100 mg Oral BID    metoprolol succinate  100 mg Oral Daily    sodium chloride flush  5-40 mL IntraVENous 2 times per day    pantoprazole  40 mg Oral BID AC    propofol        etomidate        etomidate           Allergies: No Known Allergies      Vital Signs     Vitals:    10/15/22 0700   BP: (!) 165/121   Pulse: 82   Resp: 14   Temp: 97.7 °F (36.5 °C)   SpO2: 95%         Intake/Output Summary (Last 24 hours) at 10/15/2022 0719  Last data filed at 10/15/2022 0700  Gross per 24 hour   Intake 600 ml   Output 800 ml   Net -200 ml         Physical Exam     General appearance: NAD  Head: Normocephalic, without obvious abnormality, atraumatic   Mouth: Moist mucous membrane  Neck: Supple. Lungs: Good air entry bilaterally. No respiratory distress on RA.  Decrease breath sounds at bases  Heart: S1, S2.  Abdomen: soft, non-tender non-distended  Extremities:Chronic appearing significant leg edema  Skin: No concerning rashes noted  Psych: good eye contact, normal affect  Neuro: AAO x 3, non focal.       Laboratory Data     Please see above     Diagnostic Studies   Pertinent images reviewed

## 2022-10-15 NOTE — PROGRESS NOTES
4 Eyes Admission Assessment     I agree as the admission nurse that 2 RN's have performed a thorough Head to Toe Skin Assessment on the patient. ALL assessment sites listed below have been assessed on admission. Areas assessed by both nurses:   [x]   Head, Face, and Ears   [x]   Shoulders, Back, and Chest  [x]   Arms, Elbows, and Hands   [x]   Coccyx, Sacrum, and Ischium  [x]   Legs, Feet, and Heels        Does the Patient have Skin Breakdown?   No         Benny Prevention initiated:  Yes   Wound Care Orders initiated:  NA      [facility] nurse consulted for Pressure Injury (Stage 3,4, Unstageable, DTI, NWPT, and Complex wounds) or Benny score 18 or lower:  NA      Nurse 1 eSignature: Electronically signed by Cordelia Perez RN on 10/15/22 at 5:32 AM EDT    **SHARE this note so that the co-signing nurse is able to place an eSignature**    Nurse 2 eSignature: Electronically signed by Yeny Meyer RN on 10/15/22 at 6:37 AM EDT

## 2022-10-15 NOTE — CONSULTS
Encounter   Medication Sig Dispense Refill    furosemide (LASIX) 40 MG tablet Take 1 tablet by mouth daily 60 tablet 3    hydrALAZINE (APRESOLINE) 100 MG tablet Take 1 tablet by mouth 2 times daily 180 tablet 1    amLODIPine (NORVASC) 5 MG tablet Take 1 tablet by mouth daily 90 tablet 1    metoprolol succinate (TOPROL XL) 100 MG extended release tablet Take 1 tablet by mouth in the morning. 90 tablet 0         Scheduled Meds:   amLODIPine  5 mg Oral Daily    furosemide  40 mg Oral Daily    hydrALAZINE  100 mg Oral BID    metoprolol succinate  100 mg Oral Daily    sodium chloride flush  5-40 mL IntraVENous 2 times per day    pantoprazole  40 mg Oral BID AC    propofol        etomidate        etomidate          Continuous Infusions:   sodium chloride      sodium chloride       PRN Meds:sodium chloride, sodium chloride flush, sodium chloride, ondansetron **OR** ondansetron, polyethylene glycol, acetaminophen **OR** acetaminophen, hydrOXYzine HCl    Allergies: No Known Allergies    REVIEW OF SYSTEMS:       History obtained from the patient    Review of Systems  Per history    PHYSICAL EXAM:       Vitals: BP (!) 165/121   Pulse 82   Temp 97.7 °F (36.5 °C)   Resp 14   Ht 6' 2\" (1.88 m)   Wt 264 lb 1.8 oz (119.8 kg)   SpO2 95%   BMI 33.91 kg/m²     I/O:    Intake/Output Summary (Last 24 hours) at 10/15/2022 0719  Last data filed at 10/15/2022 0700  Gross per 24 hour   Intake 600 ml   Output 800 ml   Net -200 ml     No intake/output data recorded. I/O last 3 completed shifts: In: 600 [Blood:600]  Out: 800 [Urine:800]    Physical Examination:     Physical Exam  Constitutional:       General: He is not in acute distress. HENT:      Head: Normocephalic. Mouth/Throat:      Pharynx: Oropharynx is clear. Eyes:      Pupils: Pupils are equal, round, and reactive to light. Cardiovascular:      Rate and Rhythm: Normal rate and regular rhythm. Pulses: Normal pulses. Heart sounds: Normal heart sounds.  No murmur heard. Pulmonary:      Effort: Pulmonary effort is normal. No respiratory distress. Breath sounds: Normal breath sounds. Abdominal:      General: Abdomen is flat. Bowel sounds are normal. There is no distension. Palpations: Abdomen is soft. Tenderness: There is no abdominal tenderness. Musculoskeletal:      Comments: Bilateral LE swelling   Skin:     General: Skin is warm. Findings: No rash. Neurological:      General: No focal deficit present. Mental Status: He is alert and oriented to person, place, and time. No results for input(s): PHART, EQZ4SVV, PO2ART in the last 72 hours. DATA:       Labs:  CBC:   Recent Labs     10/14/22  1339 10/15/22  0010 10/15/22  0419   WBC 8.4 9.9  --    HGB 5.7* 5.7* 5.6*   HCT 17.5* 17.4* 16.9*    281  --        BMP:   Recent Labs     10/14/22  1339 10/15/22  0010    138   K 4.1 3.6    98*   CO2 18* 18*   * 110*   CREATININE 14.0* 13.8*   GLUCOSE 116* 115*     LFT's:   Recent Labs     10/14/22  1339 10/15/22  0010   AST 13* 13*   ALT 17 16   BILITOT 0.3 <0.2   ALKPHOS 83 84     Troponin:   Recent Labs     10/15/22  0010   TROPONINI 0.06*     BNP:No results for input(s): BNP in the last 72 hours. ABGs: No results for input(s): PHART, SJA1LWJ, PO2ART in the last 72 hours. INR:   Recent Labs     10/15/22  0010   INR 1.29*       U/A:No results for input(s): NITRITE, COLORU, PHUR, LABCAST, WBCUA, RBCUA, MUCUS, TRICHOMONAS, YEAST, BACTERIA, CLARITYU, SPECGRAV, LEUKOCYTESUR, UROBILINOGEN, BILIRUBINUR, BLOODU, GLUCOSEU, AMORPHOUS in the last 72 hours. Invalid input(s): KETONESU    XR CHEST (2 VW)   Final Result      Gross cardiac enlargement. .      No acute traumatic consolidation. ASSESSMENT AND PLAN:   Angelita Montelongo is a 55 y.o. male with PMHx of CKD V (not on dialysis), HTN and HLD who presented complaining of leg swelling and recent abnormal labs.      CKD V  Patient with history of CKD stage V (now on dialysis), likely secondary to uncontrolled hypertension, presented with bilateral leg swelling. Creatinine of 13.8 on presentation. Was given 80 mg Lasix IV in the ED  -Nephrology consult     Anemia likely 2/2 CKD vs Upper GI bleed  Patient states that he has been having dark stools and had a hemoglobin of 5.7 on presentation  -Transfuse 3 units of RBCs  -Protonix 40 mg twice daily  -GI consult  -NPO     Suspected new-onset CHF  Patient presented with bilateral leg swelling, weight gain, and shortness of breath on exertion. On presentation, BNP >70,000. CXR showed gross cardiac enlargement with no acute traumatic consolidation.  -Echocardiogram  -Strict I's and O  -Daily weights     HTN  Patient is on Lasix 40 mg, hydralazine 100 mg twice daily, amlodipine 5 mg, and metoprolol 100 mg at home  -Continue home meds        Code Status:No Order  FEN: NPO  PPX:  SCDs  DISPO: ICU    This patient has been staffed and discussed with Dr. Lillian Troncoso    ----  Yamileth Gonzalez MD, PGY-1  10/15/2022  7:19 AM       Patient seen, examined and discussed with the resident and I agree with the assessment and plan. Briefly, this is a 55 y.o. male with CKD V and anemia. Patient volume overloaded and likely needs to start on dialysis. Anemia could be 2/2 GIB but more likely 2/2 advanced kidney disease. ECHO pending. Bleed, if any, is likely slow since patient walked into the hospital and was hypertensive. He's going to get transfused 2 units of blood this morning. If he has an appropriate response then can transfer out of ICU for continued workup.       Sigrid Yadav MD

## 2022-10-15 NOTE — PROGRESS NOTES
Patient admitted to ICU 4513 from ED. Patient placed on ICU monitor, CHG bath given and 4 eyes performed. Patient alert and oriented x4, on RA, denies pain, SOB or lightheadedness at this time. Patient belongings placed in closet, cell phone and wallet given to patient. Patient updated on plan of care and agreeable, all questions answered. Safety precautions in place.

## 2022-10-15 NOTE — ED PROVIDER NOTES
[id number] [facility] RESIDENT NOTE     Date of Evaluation: 10/14/2022    Chief Complaint     Leg Swelling (C/o on going leg swelling for a \"few weeks\" saw PCP and sent to get blood work today) and Anemia (PCP called tonight and sent to ED d/t blood count low, patient states told \"5.0\" patient states sob, and dark stools for few days )    History of Present Illness     Avi Youssef is a 55 y.o. male with PMHx CKD5 (not on HD, poor medication adherence), HTN, HLD, bilateral lower extremity edema who presents with anemia. Over the past month, patient states that he has had worsening exertional shortness of breath stating he becomes very short of breath even when climbing a single flight of stairs. He denies any associated chest pain including exertional chest pain. He also endorses orthopnea during this time, he has noted worsening swelling of his lower extremities and reports a 20-30 pound weight gain over the past few months. Given these symptoms, he presented to his primary care provider earlier today who noted that his hypertension is still poorly controlled with the patient possibly nonadherent to his amlodipine 10 mg daily and Toprol- mg daily. He had a CBC drawn at clinic which was notable for anemia to 5.7 for which he was referred to the ED. of note, patient also reports that he has been having some dark black sticky stools over the past several days but denies any drea hematochezia. He denies any hematemesis or hematuria. He does not take any anticoagulation. He denies any history of heart failure. On ROS, he denies any fevers, chills, sore throat, rhinorrhea, nasal congestion, cough, abdominal pain, nausea/vomiting, diarrhea, hematochezia, dysuria, hematuria, headache, lightheadedness, loss of conscious. Review of Systems     Review of Systems   All other systems reviewed and are negative.     Past Medical, Surgical, Family, and Social History     He has a past medical history of Anxiety, CKD (chronic kidney disease), Hyperlipidemia LDL goal <100, Hypertension, and Obesity (BMI 30.0-34.9). He has no past surgical history on file. His family history is not on file. He reports that he has been smoking cigarettes. He has never used smokeless tobacco. He reports that he does not drink alcohol and does not use drugs. Medications     Previous Medications    AMLODIPINE (NORVASC) 5 MG TABLET    Take 1 tablet by mouth daily    FUROSEMIDE (LASIX) 40 MG TABLET    Take 1 tablet by mouth daily    HYDRALAZINE (APRESOLINE) 100 MG TABLET    Take 1 tablet by mouth 2 times daily    METOPROLOL SUCCINATE (TOPROL XL) 100 MG EXTENDED RELEASE TABLET    Take 1 tablet by mouth in the morning. Allergies     He has No Known Allergies. Physical Exam     INITIAL VITALS:   BP: (!) 157/104, Temp: 97.9 °F (36.6 °C), Heart Rate: 81, Resp: 20, SpO2: 100 %     General: 55 y.o. male, well-appearing; in no apparent distress. Head: Normocephalic, atraumatic. Eyes: Anicteric. PERRL, EOMI.  ENT: No nasal discharge. Mucous membranes are moist.  Neck: Supple, full ROM, trachea midline. Pulmonary: Non-labored breathing. Lungs clear to auscultation bilaterally with symmetric aeration. No wheezes/rales/rhonchi. Cardiac: Regular rate and rhythm. No murmurs/rubs/gallops. Abdomen: Distended. Soft, non-tender. No rebound or guarding. Rectal: Normal rectal tone. No visible external hemorrhoids. No drea blood or melena. Musculoskeletal: No long bone extremity deformity. Extremities: Warm and well-perfused. 2+ pitting edema to his mid thighs bilaterally. Skin: No rashes or bruising. Neuro: Alert and oriented x3. Speech normal. Moves UE and LE spontaneously and symmetrically. Psych: Mood and affect appropriate for situation. Diagnostic Results     EKG:   Interpreted in conjunction with Emergency Department attending physician, Elana Javier MD  Rhythm: normal sinus   Rate: normal  Axis: normal  Ectopy: none  Conduction: normal (read as QTc prolongation by machine to 500 ms, though QT segment shorter than half the R-R interval)  ST Segments: no ST elevations or depressions  T Waves: T wave inversions in leads I and aVL  Q Waves: III and aVf  Clinical Impression: no evidence of acute ischemia  Comparison: no prior available for comparison    RADIOLOGY:  XR CHEST (2 VW)   Final Result      Gross cardiac enlargement. .      No acute traumatic consolidation.         LABS:  Results for orders placed or performed during the hospital encounter of 10/14/22   BMP w/ Reflex to MG   Result Value Ref Range    Sodium 138 136 - 145 mmol/L    Potassium reflex Magnesium 3.6 3.5 - 5.1 mmol/L    Chloride 98 (L) 99 - 110 mmol/L    CO2 18 (L) 21 - 32 mmol/L    Anion Gap 22 (H) 3 - 16    Glucose 115 (H) 70 - 99 mg/dL     (HH) 7 - 20 mg/dL    Creatinine 13.8 (HH) 0.9 - 1.3 mg/dL    GFR Non-African American 4 (A) >60    GFR  5 (A) >60    Calcium 8.0 (L) 8.3 - 10.6 mg/dL   CBC with Auto Differential   Result Value Ref Range    WBC 9.9 4.0 - 11.0 K/uL    RBC 1.96 (L) 4.20 - 5.90 M/uL    Hemoglobin 5.7 (LL) 13.5 - 17.5 g/dL    Hematocrit 17.4 (LL) 40.5 - 52.5 %    MCV 88.7 80.0 - 100.0 fL    MCH 29.0 26.0 - 34.0 pg    MCHC 32.7 31.0 - 36.0 g/dL    RDW 15.2 12.4 - 15.4 %    Platelets 089 697 - 868 K/uL    MPV 8.4 5.0 - 10.5 fL    Neutrophils % 74.7 %    Lymphocytes % 12.5 %    Monocytes % 8.2 %    Eosinophils % 3.1 %    Basophils % 1.5 %    Neutrophils Absolute 7.4 1.7 - 7.7 K/uL    Lymphocytes Absolute 1.2 1.0 - 5.1 K/uL    Monocytes Absolute 0.8 0.0 - 1.3 K/uL    Eosinophils Absolute 0.3 0.0 - 0.6 K/uL    Basophils Absolute 0.1 0.0 - 0.2 K/uL   Hepatic Function Panel   Result Value Ref Range    Total Protein 6.7 6.4 - 8.2 g/dL    Albumin 3.7 3.4 - 5.0 g/dL    Alkaline Phosphatase 84 40 - 129 U/L    ALT 16 10 - 40 U/L    AST 13 (L) 15 - 37 U/L    Total Bilirubin <0.2 0.0 - 1.0 mg/dL    Bilirubin, Direct <0.2 0.0 - 0.3 mg/dL    Bilirubin, Indirect see below 0.0 - 1.0 mg/dL   Lipase   Result Value Ref Range    Lipase 26.0 13.0 - 60.0 U/L   Magnesium   Result Value Ref Range    Magnesium 2.80 (H) 1.80 - 2.40 mg/dL   Protime-INR   Result Value Ref Range    Protime 16.0 (H) 11.7 - 14.5 sec    INR 1.29 (H) 0.87 - 1.14   Troponin   Result Value Ref Range    Troponin 0.06 (H) <0.01 ng/mL   Brain Natriuretic Peptide   Result Value Ref Range    Pro-BNP >70,000 (H) 0 - 124 pg/mL   POCT Blood Occult   Result Value Ref Range    POC Occult Blood Stool Positive Negative     ED BEDSIDE ULTRASOUND:  None    RECENT VITALS:   BP: (!) 157/104, Temp: 97.9 °F (36.6 °C), Heart Rate: 81,Resp: 20, SpO2: 100 %     Procedures     None    ED Course     Nursing Notes, Past Medical Hx, Past Surgical Hx, Social Hx, Allergies, and Family Hx were reviewed. PATIENT GIVEN FOLLOWING MEDICATIONS:  Orders Placed This Encounter   Medications    pantoprazole (PROTONIX) injection 80 mg    cefTRIAXone (ROCEPHIN) 1,000 mg in dextrose 5 % 50 mL IVPB mini-bag     Order Specific Question:   Antimicrobial Indications     Answer: Other     Order Specific Question:   Other Abx Indication     Answer:   Upper GI bleed    0.9 % sodium chloride infusion    furosemide (LASIX) injection 80 mg     CONSULTS:  IP CONSULT TO NEPHROLOGY  IP CONSULT TO HOSPITALIST  IP CONSULT TO CRITICAL CARE    MEDICAL DECISION MAKING / ASSESSMENT / Shannen Julieta is a 55 y.o. male who presents with renal failure and anemia. On presentation, patient is chronically ill-appearing but nontoxic and hemodynamically stable. Overall, renal failure is most likely secondary to progression of the patient's known stage V CKD which is likely contributed to volume overload leading to his exertional shortness of breath, orthopnea, and bilateral lower extremity edema.   Additionally, his new onset renal failure is likely contributing to his anemia though patient also endorses possible episodes of melena over the past several days. However, rectal exam reassuring without any gross melena or hematochezia. His anemia is also likely contributing to his exertional shortness of breath. Patient states he still produces urine. ED Course as of 10/15/22 0158   Sat Oct 15, 2022   0036 Hemoglobin Quant(!!): 5.7  Anemia to 5.7 [CB]   0044 BMP w/ Reflex to MG(!!):    Sodium 138   Potassium 3.6   Chloride 98(!)   CO2 18(!)   Anion Gap 22(!)   Glucose, Random 115(!)   BUN,BUNPL 110(!!)   Creatinine 13.8(!!)   GFR Non- 4(!)   GFR  5(!)   CALCIUM, SERUM, 992221 8.0(!)  BMP notable for renal failure with a creatinine of 13.8 and a BUN of 110 in addition to a metabolic acidosis [CB]   2881 INR(!): 1.29  Mildly elevated to 1.29 [CB]   0044 Hepatic Function Panel(!):    Total Protein 6.7   Albumin 3.7   Alk Phos 84   ALT 16   AST 13(!)   Bilirubin <0.2   Bilirubin, Direct <0.2   Bilirubin, Indirect see below  Within normal limits [CB]   0045 Magnesium(!): 2.80  Hypermagnesemia 2.0 [CB]   0045 Lipase: 26.0  Within normal limit [CB]   0045 Given anemia to 5.7, patient was consented for blood transfusion and a type and screen was sent. Additionally, given concomitant renal failure with a creatinine of 13.8, nephrology was consulted with plan to discuss plans for possible hemodialysis [CB]   0046 XR CHEST (2 VW)  Gross cardiac enlargement. No acute traumatic consolidation. [CB]   0103 Discussed patient's presentation with nephrology who recommend treating the patient with Lasix 80 mg IV along with transfusion of 1 unit of PRBCs at a slow rate. Given patient does not have any evidence of uremic encephalopathy, no hyperkalemia, and no oxygen requirement secondary to volume overload, no indication for acute dialysis at this time.   Discussed potentially placing an HD CVC for nephrology who recommended that we defer placement until he can have a formal discussion with nephrology regarding dialysis in the morning [CB]   0108 We will admit patient to ICU level of care given likely need for renal replacement therapy in the a.m. pending conversations with nephrology. Ordered patient for iron studies per nephrology recommendation [CB]   0108 Ferritin  Pending at time of admission [CB]   0109 Iron and TIBC  Pending at time of admission [CB]   0109 Blood product administration pending at time of admission [CB]   0110 Discussed with admitting hospitalist and ICU resident team who agreed with plan for admission to the ICU [CB]   0154 Troponin(!): 0.06  Troponin elevated 0.06, resulted shortly after admission to the ICU [CB]   0155 Pro-BNP(!): >70,000  Grossly elevated, resulted shortly after admission to the ICU [CB]      ED Course User Index  [CB] Mikie Goodwin MD     This patient was also evaluated by the attending physician. All care plans were discussed and agreed upon. Clinical Impression     1. Acute renal failure, unspecified acute renal failure type ([address]    2. Anemia, unspecified type    3. Leg swelling    4. Shortness of breath      Disposition     PATIENT REFERRED TO:  No follow-up provider specified. DISCHARGE MEDICATIONS:  New Prescriptions    No medications on file     DISPOSITION Admitted 10/15/2022 01:45:30 AM  At this time the patient has been admitted to the ICU for further evaluation and management of acute renal failure and anemia. The patient will continue to be monitored here in the emergency department until which time they are moved to their new treatment location. Mikie Goodwin MD   Emergency Medicine, PGY-2    This note was dictated using voice-recognition software, which occasionally leads to inadvertent typographic errors.        Mikie Goodwin MD  10/15/22 0512

## 2022-10-15 NOTE — ED PROVIDER NOTES
ED Attending Attestation Note     Date of evaluation: 10/14/2022    This patient was seen by the resident. I have seen and examined the patient, agree with the workup, evaluation, management and diagnosis. The care plan has been discussed. I have reviewed the ECG and concur with the resident's interpretation. My assessment reveals 77-year-old gentleman with a history of chronic kidney disease, hyperlipidemia, hypertension who is coming in after he had blood work done yesterday which showed his hemoglobin was low. He has been having leg swelling for the past few weeks and saw his PMD who ordered those medications. The patient has had some shortness of breath and also endorsed some dark tarry stools. No rectal exam he had brown stool noted. Was noted to be normotensive. Critical Care:  Due to the immediate potential for life-threatening deterioration due to hypovolemic shock, I spent 37 minutes providing critical care. Thistime excludes time spent performing procedures but includes time spent on direct patient care, history retrieval, review of the chart, and discussions with patient, family, and consultant(s).        Greg Manzano MD  10/15/22 9225

## 2022-10-15 NOTE — PROGRESS NOTES
Consult received   Known CKD 5 presents with Cr if 13, CRISTINA, and severe anemia. Edematous. Give lasix IV  mg, transfuse 1 unit  No acute indication for emergent dialysis but certainly seems like will be needing to initiate RRT soon. Check iron studies   Full note to follow in Vic Newsome MD  [facility]

## 2022-10-15 NOTE — PROGRESS NOTES
Hemoglobin 5.8 ; with orders to transfuse 3 units of RBCs. Ongoing transfusion of PRBC; VS checked, closely monitored. Sleep apnea noted.

## 2022-10-15 NOTE — PROGRESS NOTES
Hospitalist Update Note:    Patient transferring out of ICU today. Will assume primary care role for remainder of hospitalization. Currently undergoing HD cath placement for initiation of HD under Nephro guidance. Will f/u Hgb after transfusions and transfuse as needed to keep Hgb >7. GI evaluated and do not suspect active bleeding at this time. Will also follow-up on echocardiogram and consider cardiology consultation if shows new onset HFrEF.

## 2022-10-15 NOTE — PROGRESS NOTES
1 unit PRBC transfused, patient tolerated well, no suspected transfusion reaction. 2 unit started, will continue to monitor.

## 2022-10-15 NOTE — PROCEDURES
Maria De Jesus Day is a 55 y.o. male patient. 1. Acute renal failure, unspecified acute renal failure type ([address]    2. Anemia, unspecified type    3. Leg swelling    4. Shortness of breath      Past Medical History:   Diagnosis Date    Anxiety 3/28/2017    CKD (chronic kidney disease) 6/16/2017    Hyperlipidemia LDL goal <100 6/16/2017    Hypertension     Obesity (BMI 30.0-34.9) 6/16/2017     Blood pressure (!) 172/99, pulse 81, temperature 97.7 °F (36.5 °C), temperature source Oral, resp. rate 24, height 6' 2\" (1.88 m), weight 264 lb 1.8 oz (119.8 kg), SpO2 99 %. Central Line    Date/Time: 10/15/2022 1:32 PM  Performed by: Robin Pozo DO  Authorized by: Kenny Shepherd MD   Consent: Verbal consent obtained. Risks and benefits: risks, benefits and alternatives were discussed  Consent given by: patient  Patient understanding: patient states understanding of the procedure being performed  Patient consent: the patient's understanding of the procedure matches consent given  Procedure consent: procedure consent matches procedure scheduled  Relevant documents: relevant documents present and verified  Test results: test results available and properly labeled  Imaging studies: imaging studies available  Patient identity confirmed: verbally with patient  Indications: vascular access (For HD access)    Anesthesia:  Local Anesthetic: lidocaine 1% without epinephrine    Sedation:  Patient sedated: no    Preparation: skin prepped with ChloraPrep  Skin prep agent dried: skin prep agent completely dried prior to procedure  Sterile barriers: all five maximum sterile barriers used - cap, mask, sterile gown, sterile gloves, and large sterile sheet  Hand hygiene: hand hygiene performed prior to central venous catheter insertion  Location details: right internal jugular  Site selection rationale: No prior surgeries, lines, lesions.  Easily visualized with U/S  Patient position: flat  Catheter type: triple lumen  Catheter size: 13 Fr.  Pre-procedure: landmarks identified  Ultrasound guidance: yes  Sterile ultrasound techniques: sterile gel and sterile probe covers were used  Number of attempts: 1  Successful placement: yes  Post-procedure: line sutured and dressing applied  Assessment: blood return through all ports, free fluid flow, placement verified by x-ray and no pneumothorax on x-ray  Patient tolerance: patient tolerated the procedure well with no immediate complications  Comments: EBL 10. Vas cath flushed with total of 3.6 mL heparin between all 3 ports.          Brennan Dakins, DO   PGY1, General Surgery  10/15/22  1:36 PM  Pager # [phone]

## 2022-10-15 NOTE — CONSULTS
Clinical Pharmacy Progress Note  Medication History     Admit Date: 10/14/2022    Pharmacy consulted to verify home medication list by Dr. Iris Gray. List of of current medications patient is taking is complete. Home Medication list in EPIC updated to reflect changes noted below. Source of information: Rx fill history (Surescripts)    Patient's home pharmacy: [facility]     Changes made to medication list:   Other notes:   Pt was prescribed Hydralazine 100mg BID and Furosemide 40 mg daily at PCP visit on day of admission (10/14). Has not yet started these medications. Current Outpatient Medications   Medication Instructions    amLODIPine (NORVASC) 5 mg, Oral, DAILY    furosemide (LASIX) 40 mg, Oral, DAILY    hydrALAZINE (APRESOLINE) 100 mg, Oral, 2 TIMES DAILY    metoprolol succinate (TOPROL XL) 100 mg, Oral, DAILY     Please call with any questions.   Rachelle Almendarez, PharmD, BCPS  Wireless: [id number]   10/15/2022 9:20 AM

## 2022-10-15 NOTE — H&P
HISTORY AND PHYSICAL       Hospital Day: 1  ICU Day: 1                                                         Code:No Order  Admit Date: 10/14/2022  PCP: Lily Lerma MD                                  CC: Leg swelling    HISTORY OF PRESENT ILLNESS:   Patient is a 63-year-old male with PMHx of CKD V (not on dialysis), HTN and HLD who presented complaining of leg swelling and recent abnormal labs. Patient states that he began having bilateral leg swelling about 2 weeks. He states that he has also been having a dyspnea on exertion but denies any chest pain. He also states that he has recently gained about 20 to 30 pounds. patient also states that he was called due to very low hemoglobin on recent labs. He states that he has been having dark stools. Patient denies any lightheadedness/ dizziness, nausea/vomiting, abdominal pain, diarrhea/constipation, dysuria or hematuria. Patient states that he is compliant with his home BP medications. Patient has CKD V and has had multiple referrals to nephrologist but did not go to them. In the ED, patient was hypertensive with a /104. Labs were remarkable for a hemoglobin of 5.7, creatinine of 13.8, and BNP >70,000. CXR showed gross cardiac enlargement with no acute traumatic consolidation. Nephrology was contacted in the ED, recommended no acute indication for emergent dialysis and he was given 80mg IV lasix. He was also given 1g IV ceftriaxone for suspected upper GI bleed. PAST HISTORY:     Past Medical History:   Diagnosis Date    Anxiety 3/28/2017    CKD (chronic kidney disease) 6/16/2017    Hyperlipidemia LDL goal <100 6/16/2017    Hypertension     Obesity (BMI 30.0-34.9) 6/16/2017       History reviewed. No pertinent surgical history. Family History:  History reviewed. No pertinent family history. MEDICATIONS:     No current facility-administered medications on file prior to encounter.      Current Outpatient Medications on File Prior to Encounter   Medication Sig Dispense Refill    furosemide (LASIX) 40 MG tablet Take 1 tablet by mouth daily 60 tablet 3    hydrALAZINE (APRESOLINE) 100 MG tablet Take 1 tablet by mouth 2 times daily 180 tablet 1    amLODIPine (NORVASC) 5 MG tablet Take 1 tablet by mouth daily 90 tablet 1    metoprolol succinate (TOPROL XL) 100 MG extended release tablet Take 1 tablet by mouth in the morning. 90 tablet 0         Scheduled Meds:   Continuous Infusions:   sodium chloride       PRN Meds:sodium chloride    Allergies: No Known Allergies    REVIEW OF SYSTEMS:       History obtained from the patient    Review of Systems  Per history  PHYSICAL EXAM:       Vitals: BP (!) 163/97   Pulse 95   Temp 97.9 °F (36.6 °C) (Oral)   Resp 22   Ht 6' 2\" (1.88 m)   Wt 271 lb (122.9 kg)   SpO2 99%   BMI 34.79 kg/m²     I/O:  No intake or output data in the 24 hours ending 10/15/22 0231  No intake/output data recorded. No intake/output data recorded. Physical Examination:     Physical Exam  HENT:      Head: Normocephalic. Mouth/Throat:      Pharynx: Oropharynx is clear. Eyes:      Pupils: Pupils are equal, round, and reactive to light. Cardiovascular:      Rate and Rhythm: Normal rate and regular rhythm. Pulses: Normal pulses. Heart sounds: Normal heart sounds. No murmur heard. Pulmonary:      Effort: Pulmonary effort is normal. No respiratory distress. Breath sounds: Normal breath sounds. Abdominal:      General: Abdomen is flat. Bowel sounds are normal. There is no distension. Palpations: Abdomen is soft. Skin:     General: Skin is warm. Neurological:      Mental Status: He is alert. No results for input(s): PHART, IYL7DCX, PO2ART in the last 72 hours.         DATA:       Labs:  CBC:   Recent Labs     10/14/22  1339 10/15/22  0010   WBC 8.4 9.9   HGB 5.7* 5.7*   HCT 17.5* 17.4*    281       BMP:   Recent Labs     10/14/22  1339 10/15/22  0010    138   K 4.1 3.6    98*   CO2 18* 18*   * 110*   CREATININE 14.0* 13.8*   GLUCOSE 116* 115*     LFT's:   Recent Labs     10/14/22  1339 10/15/22  0010   AST 13* 13*   ALT 17 16   BILITOT 0.3 <0.2   ALKPHOS 83 84     Troponin:   Recent Labs     10/15/22  0010   TROPONINI 0.06*     BNP:No results for input(s): BNP in the last 72 hours. ABGs: No results for input(s): PHART, ZTI8ZDU, PO2ART in the last 72 hours. INR:   Recent Labs     10/15/22  0010   INR 1.29*       U/A:No results for input(s): NITRITE, COLORU, PHUR, LABCAST, WBCUA, RBCUA, MUCUS, TRICHOMONAS, YEAST, BACTERIA, CLARITYU, SPECGRAV, LEUKOCYTESUR, UROBILINOGEN, BILIRUBINUR, BLOODU, GLUCOSEU, AMORPHOUS in the last 72 hours. Invalid input(s): KETONESU    XR CHEST (2 VW)   Final Result      Gross cardiac enlargement. .      No acute traumatic consolidation. ASSESSMENT AND PLAN:   Christian Cuello is a 55 y.o. male with PMHx of CKD V (not on dialysis), HTN and HLD who presented complaining of leg swelling and recent abnormal labs. CKD V  Patient with history of CKD stage V (now on dialysis), likely secondary to uncontrolled hypertension, presented with bilateral leg swelling. Creatinine of 13.8 on presentation. Was given 80 mg Lasix IV in the ED  -Nephrology consult    Anemia likely 2/2 CKD vs Upper GI bleed  Patient states that he has been having dark stools and had a hemoglobin of 5.7 on presentation  -Transfuse 3 units of RBCs  -Protonix 40 mg twice daily  -GI consult  -NPO    Suspected new-onset CHF  Patient presented with bilateral leg swelling, weight gain, and shortness of breath on exertion. On presentation, BNP >70,000.  CXR showed gross cardiac enlargement with no acute traumatic consolidation.  -Echocardiogram  -Strict I's and O  -Daily weights    HTN  Patient is on Lasix 40 mg, hydralazine 100 mg twice daily, amlodipine 5 mg, and metoprolol 100 mg at home  -Continue home meds      Code Status:No Order  FEN: NPO  PPX:  SCDs  DISPO: ICU    This patient has been staffed and discussed with Sea Ramos MD.   -----------------------------  Rere Pennington MD, PGY-1  10/15/2022  2:31 AM

## 2022-10-15 NOTE — CONSULTS
[facility]   GI Consult Note      Reason for Consult:  anemia  Requesting Physician:  Gertrude    CHIEF COMPLAINT:  weak and short of breath    History Obtained From:  patient    HISTORY OF PRESENT ILLNESS:                The patient is a 55 y.o. male with significant past medical history of CKD with a creatinine of 13. He has been referred for dialysis in the past. He had hypertension. He is admitted with leg swelling and shortness of breath. He had one day of black stool and was also constipated so he took a laxative. He was found on admission to have an elevated BNP and creatinine of 13 with a low hemoglobin requiring transfusion. He has never had a colonoscopy or EGD and states that he is afraid, \"to go under\". He is being seen for anemia. Past Medical History:        Diagnosis Date    Anxiety 3/28/2017    CKD (chronic kidney disease) 6/16/2017    Hyperlipidemia LDL goal <100 6/16/2017    Hypertension     Obesity (BMI 30.0-34.9) 6/16/2017     Past Surgical History:    History reviewed. No pertinent surgical history.   Current Medications:    Current Facility-Administered Medications: 0.9 % sodium chloride infusion, , IntraVENous, PRN  sodium chloride flush 0.9 % injection 5-40 mL, 5-40 mL, IntraVENous, 2 times per day  sodium chloride flush 0.9 % injection 5-40 mL, 5-40 mL, IntraVENous, PRN  0.9 % sodium chloride infusion, , IntraVENous, PRN  ondansetron (ZOFRAN-ODT) disintegrating tablet 4 mg, 4 mg, Oral, Q8H PRN **OR** ondansetron (ZOFRAN) injection 4 mg, 4 mg, IntraVENous, Q6H PRN  polyethylene glycol (GLYCOLAX) packet 17 g, 17 g, Oral, Daily PRN  acetaminophen (TYLENOL) tablet 650 mg, 650 mg, Oral, Q6H PRN **OR** acetaminophen (TYLENOL) suppository 650 mg, 650 mg, Rectal, Q6H PRN  pantoprazole (PROTONIX) tablet 40 mg, 40 mg, Oral, BID AC  hydrOXYzine HCl (ATARAX) tablet 10 mg, 10 mg, Oral, TID PRN  amLODIPine (NORVASC) tablet 5 mg, 5 mg, Oral, Daily  hydrALAZINE (APRESOLINE) tablet 100 mg, 100 mg, Oral, BID  metoprolol succinate (TOPROL XL) extended release tablet 100 mg, 100 mg, Oral, Daily  furosemide (LASIX) injection 100 mg, 100 mg, IntraVENous, Once  calcitRIOL (ROCALTROL) capsule 0.25 mcg, 0.25 mcg, Oral, Daily  Allergies:  Patient has no known allergies. Social History:    Social History     Socioeconomic History    Marital status:      Spouse name: Not on file    Number of children: Not on file    Years of education: Not on file    Highest education level: Not on file   Occupational History    Not on file   Tobacco Use    Smoking status: Some Days     Years: 4.00     Types: Cigarettes    Smokeless tobacco: Never    Tobacco comments:      2 a week   Substance and Sexual Activity    Alcohol use: No    Drug use: No    Sexual activity: Yes     Partners: Female   Other Topics Concern    Not on file   Social History Narrative    Not on file     Social Determinants of Health     Financial Resource Strain: Not on file   Food Insecurity: Not on file   Transportation Needs: Not on file   Physical Activity: Not on file   Stress: Not on file   Social Connections: Not on file   Intimate Partner Violence: Not on file   Housing Stability: Not on file       Family History:   History reviewed. No pertinent family history.   REVIEW OF SYSTEMS:    CONSTITUTIONAL:  negative  EYES:  negative  HEENT:  negative  RESPIRATORY:  negative  CARDIOVASCULAR:  negative  GASTROINTESTINAL:  negative  INTEGUMENT/BREAST:  negative  HEMATOLOGIC/LYMPHATIC:  negative  ENDOCRINE:  negative  MUSCULOSKELETAL:  negative  NEUROLOGICAL:  negative  PHYSICAL EXAM:    General Appearance: alert and oriented to person, place and time, well developed and well- nourished, in no acute distress  Skin: warm and dry, no rash or erythema  Head: normocephalic and atraumatic  Eyes: pupils equal, round, and reactive to light, extraocular eye movements intact, conjunctivae normal  ENT: tympanic membrane, external ear and ear canal normal bilaterally, nose without deformity, nasal mucosa and turbinates normal without polyps  Neck: supple and non-tender without mass, no thyromegaly or thyroid nodules, no cervical lymphadenopathy  Pulmonary/Chest: clear to auscultation bilaterally- no wheezes, rales or rhonchi, normal air movement, no respiratory distress  Cardiovascular: normal rate, regular rhythm, normal S1 and S2, no murmurs, rubs, clicks, or gallops, distal pulses intact, no carotid bruits  Abdomen: soft, non-tender, non-distended, normal bowel sounds, no masses or organomegaly  Extremities: no cyanosis, clubbing or edema  Musculoskeletal: normal range of motion, no joint swelling, deformity or tenderness  Neurologic: reflexes normal and symmetric, no cranial nerve deficit, gait, coordination and speech normal  Vitals:    BP (!) 157/113   Pulse 76   Temp 97.7 °F (36.5 °C) (Oral)   Resp 24   Ht 6' 2\" (1.88 m)   Wt 264 lb 1.8 oz (119.8 kg)   SpO2 98%   BMI 33.91 kg/m²     DATA:    CBC with Differential:    Lab Results   Component Value Date/Time    WBC 9.9 10/15/2022 12:10 AM    RBC 1.96 10/15/2022 12:10 AM    HGB 6.6 10/15/2022 08:27 AM    HCT 20.4 10/15/2022 08:27 AM     10/15/2022 12:10 AM    MCV 88.7 10/15/2022 12:10 AM    MCH 29.0 10/15/2022 12:10 AM    MCHC 32.7 10/15/2022 12:10 AM    RDW 15.2 10/15/2022 12:10 AM    LYMPHOPCT 12.5 10/15/2022 12:10 AM    MONOPCT 8.2 10/15/2022 12:10 AM    BASOPCT 1.5 10/15/2022 12:10 AM    MONOSABS 0.8 10/15/2022 12:10 AM    LYMPHSABS 1.2 10/15/2022 12:10 AM    EOSABS 0.3 10/15/2022 12:10 AM    BASOSABS 0.1 10/15/2022 12:10 AM     CMP:    Lab Results   Component Value Date/Time     10/15/2022 12:10 AM    K 3.6 10/15/2022 12:10 AM    CL 98 10/15/2022 12:10 AM    CO2 18 10/15/2022 12:10 AM     10/15/2022 12:10 AM    CREATININE 13.8 10/15/2022 12:10 AM    GFRAA 5 10/15/2022 12:10 AM    AGRATIO 1.3 10/14/2022 01:39 PM    LABGLOM 4 10/15/2022 12:10 AM    GLUCOSE 115 10/15/2022 12:10 AM    PROT 6.7 10/15/2022 12:10 AM    LABALBU 3.7 10/15/2022 12:10 AM    CALCIUM 8.0 10/15/2022 12:10 AM    BILITOT <0.2 10/15/2022 12:10 AM    ALKPHOS 84 10/15/2022 12:10 AM    AST 13 10/15/2022 12:10 AM    ALT 16 10/15/2022 12:10 AM     PT/INR:    Lab Results   Component Value Date/Time    PROTIME 16.0 10/15/2022 12:10 AM    INR 1.29 10/15/2022 12:10 AM     Troponin:    Lab Results   Component Value Date/Time    TROPONINI 0.06 10/15/2022 12:10 AM       IMPRESSION/RECOMMENDATIONS:      Anemia which is likely multifactorial with his CKD. He is not actively bleeding. He has a markedly elevated creatinine and BNP. Would start pantoprazole and follow blood count. He may resume a diet.     Electronically signed by Kiran Ventura MD on 10/15/2022 at 9:42 AM

## 2022-10-16 ENCOUNTER — PATIENT MESSAGE (OUTPATIENT)
Dept: PRIMARY CARE CLINIC | Age: 47
End: 2022-10-16

## 2022-10-16 PROBLEM — D64.9 ANEMIA: Status: ACTIVE | Noted: 2022-10-16

## 2022-10-16 PROBLEM — M79.89 LEG SWELLING: Status: ACTIVE | Noted: 2022-10-16

## 2022-10-16 LAB
ANION GAP SERPL CALCULATED.3IONS-SCNC: 16 MMOL/L (ref 3–16)
BASOPHILS ABSOLUTE: 0.1 K/UL (ref 0–0.2)
BASOPHILS RELATIVE PERCENT: 0.8 %
BUN BLDV-MCNC: 87 MG/DL (ref 7–20)
CALCIUM SERPL-MCNC: 8 MG/DL (ref 8.3–10.6)
CHLORIDE BLD-SCNC: 100 MMOL/L (ref 99–110)
CO2: 22 MMOL/L (ref 21–32)
CREAT SERPL-MCNC: 12.3 MG/DL (ref 0.9–1.3)
EOSINOPHILS ABSOLUTE: 0.3 K/UL (ref 0–0.6)
EOSINOPHILS RELATIVE PERCENT: 3.4 %
GFR AFRICAN AMERICAN: 5
GFR NON-AFRICAN AMERICAN: 4
GLUCOSE BLD-MCNC: 121 MG/DL (ref 70–99)
HAPTOGLOBIN: 264 MG/DL (ref 30–200)
HBV SURFACE AB TITR SER: <3.5 MIU/ML
HCT VFR BLD CALC: 23.7 % (ref 40.5–52.5)
HCT VFR BLD CALC: 25 % (ref 40.5–52.5)
HEMOGLOBIN: 8.2 G/DL (ref 13.5–17.5)
HEMOGLOBIN: 8.4 G/DL (ref 13.5–17.5)
HEPATITIS B SURFACE ANTIGEN INTERPRETATION: NORMAL
LYMPHOCYTES ABSOLUTE: 1.1 K/UL (ref 1–5.1)
LYMPHOCYTES RELATIVE PERCENT: 11.6 %
MAGNESIUM: 2.6 MG/DL (ref 1.8–2.4)
MCH RBC QN AUTO: 30.8 PG (ref 26–34)
MCHC RBC AUTO-ENTMCNC: 34.7 G/DL (ref 31–36)
MCV RBC AUTO: 88.7 FL (ref 80–100)
MONOCYTES ABSOLUTE: 0.9 K/UL (ref 0–1.3)
MONOCYTES RELATIVE PERCENT: 9.7 %
NEUTROPHILS ABSOLUTE: 6.9 K/UL (ref 1.7–7.7)
NEUTROPHILS RELATIVE PERCENT: 74.5 %
PDW BLD-RTO: 15.9 % (ref 12.4–15.4)
PHOSPHORUS: 6.9 MG/DL (ref 2.5–4.9)
PLATELET # BLD: 234 K/UL (ref 135–450)
PMV BLD AUTO: 8.4 FL (ref 5–10.5)
POTASSIUM REFLEX MAGNESIUM: 2.9 MMOL/L (ref 3.5–5.1)
RBC # BLD: 2.68 M/UL (ref 4.2–5.9)
SODIUM BLD-SCNC: 138 MMOL/L (ref 136–145)
VITAMIN D 25-HYDROXY: 18.5 NG/ML
WBC # BLD: 9.2 K/UL (ref 4–11)

## 2022-10-16 PROCEDURE — 2060000000 HC ICU INTERMEDIATE R&B

## 2022-10-16 PROCEDURE — 6360000002 HC RX W HCPCS: Performed by: INTERNAL MEDICINE

## 2022-10-16 PROCEDURE — 82306 VITAMIN D 25 HYDROXY: CPT

## 2022-10-16 PROCEDURE — 83735 ASSAY OF MAGNESIUM: CPT

## 2022-10-16 PROCEDURE — 2500000003 HC RX 250 WO HCPCS: Performed by: STUDENT IN AN ORGANIZED HEALTH CARE EDUCATION/TRAINING PROGRAM

## 2022-10-16 PROCEDURE — 85014 HEMATOCRIT: CPT

## 2022-10-16 PROCEDURE — 85025 COMPLETE CBC W/AUTO DIFF WBC: CPT

## 2022-10-16 PROCEDURE — 84100 ASSAY OF PHOSPHORUS: CPT

## 2022-10-16 PROCEDURE — 36415 COLL VENOUS BLD VENIPUNCTURE: CPT

## 2022-10-16 PROCEDURE — 6370000000 HC RX 637 (ALT 250 FOR IP): Performed by: STUDENT IN AN ORGANIZED HEALTH CARE EDUCATION/TRAINING PROGRAM

## 2022-10-16 PROCEDURE — 6360000002 HC RX W HCPCS: Performed by: STUDENT IN AN ORGANIZED HEALTH CARE EDUCATION/TRAINING PROGRAM

## 2022-10-16 PROCEDURE — 2580000003 HC RX 258

## 2022-10-16 PROCEDURE — 80048 BASIC METABOLIC PNL TOTAL CA: CPT

## 2022-10-16 PROCEDURE — 85018 HEMOGLOBIN: CPT

## 2022-10-16 PROCEDURE — 6370000000 HC RX 637 (ALT 250 FOR IP)

## 2022-10-16 RX ORDER — HYDRALAZINE HYDROCHLORIDE 20 MG/ML
5 INJECTION INTRAMUSCULAR; INTRAVENOUS ONCE
Status: COMPLETED | OUTPATIENT
Start: 2022-10-16 | End: 2022-10-16

## 2022-10-16 RX ORDER — LABETALOL HYDROCHLORIDE 5 MG/ML
20 INJECTION, SOLUTION INTRAVENOUS EVERY 4 HOURS PRN
Status: DISCONTINUED | OUTPATIENT
Start: 2022-10-16 | End: 2022-10-21 | Stop reason: HOSPADM

## 2022-10-16 RX ORDER — HEPARIN SODIUM 5000 [USP'U]/ML
5000 INJECTION, SOLUTION INTRAVENOUS; SUBCUTANEOUS EVERY 8 HOURS SCHEDULED
Status: DISCONTINUED | OUTPATIENT
Start: 2022-10-16 | End: 2022-10-21 | Stop reason: HOSPADM

## 2022-10-16 RX ORDER — POTASSIUM CHLORIDE 20 MEQ/1
40 TABLET, EXTENDED RELEASE ORAL ONCE
Status: COMPLETED | OUTPATIENT
Start: 2022-10-16 | End: 2022-10-16

## 2022-10-16 RX ORDER — POTASSIUM CHLORIDE 7.45 MG/ML
10 INJECTION INTRAVENOUS
Status: COMPLETED | OUTPATIENT
Start: 2022-10-16 | End: 2022-10-16

## 2022-10-16 RX ADMIN — CARVEDILOL 12.5 MG: 12.5 TABLET, FILM COATED ORAL at 18:52

## 2022-10-16 RX ADMIN — SODIUM CHLORIDE, PRESERVATIVE FREE 10 ML: 5 INJECTION INTRAVENOUS at 10:20

## 2022-10-16 RX ADMIN — HYDRALAZINE HYDROCHLORIDE 5 MG: 20 INJECTION INTRAMUSCULAR; INTRAVENOUS at 01:44

## 2022-10-16 RX ADMIN — HYDRALAZINE HYDROCHLORIDE 50 MG: 50 TABLET, FILM COATED ORAL at 13:59

## 2022-10-16 RX ADMIN — CALCITRIOL CAPSULES 0.25 MCG 0.25 MCG: 0.25 CAPSULE ORAL at 10:19

## 2022-10-16 RX ADMIN — POTASSIUM CHLORIDE 10 MEQ: 10 INJECTION, SOLUTION INTRAVENOUS at 10:31

## 2022-10-16 RX ADMIN — PANTOPRAZOLE SODIUM 40 MG: 40 TABLET, DELAYED RELEASE ORAL at 18:53

## 2022-10-16 RX ADMIN — HEPARIN SODIUM 5000 UNITS: 5000 INJECTION INTRAVENOUS; SUBCUTANEOUS at 13:59

## 2022-10-16 RX ADMIN — AMLODIPINE BESYLATE 10 MG: 10 TABLET ORAL at 10:19

## 2022-10-16 RX ADMIN — LABETALOL HYDROCHLORIDE 20 MG: 5 INJECTION, SOLUTION INTRAVENOUS at 18:53

## 2022-10-16 RX ADMIN — CARVEDILOL 12.5 MG: 12.5 TABLET, FILM COATED ORAL at 10:19

## 2022-10-16 RX ADMIN — POTASSIUM CHLORIDE 40 MEQ: 1500 TABLET, EXTENDED RELEASE ORAL at 07:48

## 2022-10-16 RX ADMIN — PANTOPRAZOLE SODIUM 40 MG: 40 TABLET, DELAYED RELEASE ORAL at 07:48

## 2022-10-16 RX ADMIN — HEPARIN SODIUM 5000 UNITS: 5000 INJECTION INTRAVENOUS; SUBCUTANEOUS at 21:42

## 2022-10-16 RX ADMIN — HEPARIN SODIUM 5000 UNITS: 5000 INJECTION INTRAVENOUS; SUBCUTANEOUS at 10:20

## 2022-10-16 RX ADMIN — HYDRALAZINE HYDROCHLORIDE 50 MG: 50 TABLET, FILM COATED ORAL at 07:48

## 2022-10-16 RX ADMIN — HYDRALAZINE HYDROCHLORIDE 50 MG: 50 TABLET, FILM COATED ORAL at 21:42

## 2022-10-16 RX ADMIN — POTASSIUM CHLORIDE 10 MEQ: 10 INJECTION, SOLUTION INTRAVENOUS at 11:36

## 2022-10-16 NOTE — PROGRESS NOTES
Hospitalist Progress Note      PCP: Varsha Elizabeth MD    Date of Admission: 10/14/2022    Chief Complaint: Leg Swelling    Hospital Course: Patient is a 54 yo M w/ PMH of CKD, HTN, HLD who presented on 10/15 with worsening leg swelling ans was found to be in acute renal failure, as well as severely anemic to 5.7. He was admitted to ICU initially for close monitoring and started on HD. Required total of 3 units PRBCs but Hgb now stable in 8's. GI evaluated patient and did not suspect active bleeding; thought anemia more chronic and related to renal disease. Transferred out of ICU later in day on 10/15. Subjective: No acute clinical changes reported overnight. Afebrile and hemodynamically stable. Overall feels okay this am and has no new symptomatic complaints. Tolerated first run of HD well yesterday. Medications:  Reviewed    Infusion Medications    sodium chloride      sodium chloride      sodium chloride       Scheduled Medications    potassium chloride  10 mEq IntraVENous Q1H    sodium chloride flush  5-40 mL IntraVENous 2 times per day    pantoprazole  40 mg Oral BID AC    calcitRIOL  0.25 mcg Oral Daily    carvedilol  12.5 mg Oral BID WC    amLODIPine  10 mg Oral Daily    hydrALAZINE  50 mg Oral 3 times per day     PRN Meds: sodium chloride, sodium chloride flush, sodium chloride, ondansetron **OR** ondansetron, polyethylene glycol, acetaminophen **OR** acetaminophen, hydrOXYzine HCl, sodium chloride      Intake/Output Summary (Last 24 hours) at 10/16/2022 0757  Last data filed at 10/16/2022 0000  Gross per 24 hour   Intake 890.83 ml   Output 900 ml   Net -9.17 ml       Physical Exam Performed:    BP (!) 168/124   Pulse 85   Temp 98.4 °F (36.9 °C) (Oral)   Resp 28   Ht 6' 2\" (1.88 m)   Wt 264 lb 1.8 oz (119.8 kg)   SpO2 92%   BMI 33.91 kg/m²     General appearance: No apparent distress, appears stated age and cooperative. HEENT: Pupils equal, round, and reactive to light. Conjunctivae/corneas clear. Neck: Supple, with full range of motion. No jugular venous distention. Trachea midline. Respiratory:  Normal respiratory effort. Clear to auscultation, bilaterally without Rales/Wheezes/Rhonchi. Cardiovascular: Regular rate and rhythm with normal S1/S2 without murmurs, rubs or gallops. Abdomen: Soft, non-tender, non-distended with normal bowel sounds. Musculoskeletal: 2+ pitting edema up to knees bilaterally. Full ROM and no signs of trauma  Skin: Skin color, texture, turgor normal.  No rashes or lesions. Neurologic:  Neurovascularly intact without any focal sensory/motor deficits. Cranial nerves: II-XII intact, grossly non-focal.  Psychiatric: Alert and oriented, thought content appropriate, normal insight  Capillary Refill: Brisk, 3 seconds, normal   Peripheral Pulses: +2 palpable, equal bilaterally       Labs:   Recent Labs     10/14/22  1339 10/15/22  0010 10/15/22  0419 10/15/22  1615 10/15/22  2148 10/16/22  0607   WBC 8.4 9.9  --   --   --  9.2   HGB 5.7* 5.7*   < > 5.7* 8.1* 8.2*   HCT 17.5* 17.4*   < > 17.4* 23.5*  23.3* 23.7*    281  --   --   --  234    < > = values in this interval not displayed.      Recent Labs     10/14/22  1339 10/15/22  0010 10/15/22  0223 10/16/22  0607    138  --  138   K 4.1 3.6  --  2.9*    98*  --  100   CO2 18* 18*  --  22   * 110*  --  87*   CREATININE 14.0* 13.8*  --  12.3*   CALCIUM 8.3 8.0*  --  8.0*   PHOS  --   --  8.6* 6.9*     Recent Labs     10/14/22  1339 10/15/22  0010   AST 13* 13*   ALT 17 16   BILIDIR  --  <0.2   BILITOT 0.3 <0.2   ALKPHOS 83 84     Recent Labs     10/15/22  0010   INR 1.29*     Recent Labs     10/15/22  0010   TROPONINI 0.06*       Urinalysis:      Lab Results   Component Value Date/Time    NITRU Negative 10/15/2022 09:27 PM    WBCUA 10-20 10/15/2022 09:27 PM    RBCUA 0-2 10/15/2022 09:27 PM    BLOODU SMALL 10/15/2022 09:27 PM    SPECGRAV 1.020 10/15/2022 09:27 PM    GLUCOSEU Negative 10/15/2022 09:27 PM       Radiology:  XR CHEST PORTABLE   Final Result   1. Cardiomegaly. XR CHEST (2 VW)   Final Result      Gross cardiac enlargement. .      No acute traumatic consolidation. Assessment/Plan:    Active Hospital Problems    Diagnosis     Anemia [D64.9]      Priority: Medium    Acute renal failure (ARF) ([facility] [address] [N17.9]      Priority: Medium    ESRD (end stage renal disease) ([facility] [address] [N18.6]      Priority: Medium    CKD (chronic kidney disease) [N18.9]     Essential hypertension [I10]        New presentation of ESRD  - Nephrology consulted  - HD Cath path placed  - HD run #1 yesterday; #2 per Nephro  - Trend renal function labs  - Hold further diuretics     Anemia likely 2/2 CKD vs Upper GI bleed  -S/p 3 units PRBCs w/ Hgb of 8 currently  -Trend Hgb q12h today; can probably space to qd tomorrow if stable  -Protonix 40 mg PO bid  -GI consulted, no acute intervention indicated currently; will re-enngage if clinical changes  -Resume diet     Suspected new-onset CHF  Patient presented with bilateral leg swelling, weight gain, and shortness of breath on exertion. On presentation, BNP >70,000. CXR showed gross cardiac enlargement with no acute traumatic consolidation.  -Echocardiogram completed, will f/u read  -Plan for cardiology consult if newly reduced EF  -Strict I's and O  -Daily weights     HTN  Patient is on Lasix 40 mg, hydralazine 100 mg twice daily, amlodipine 5 mg, and metoprolol 100 mg at home  -Current regimen per Nephro recs: amlodipine 10 mg, Carvedilol 12.5 mg bid, hydralazine PO q8h  -Will continue titrating as needed        DVT Prophylaxis: Heparin subq  Diet: ADULT DIET; Regular;  Low Sodium (2 gm)  Code Status: Full Code  PT/OT Eval Status: N/A    Dispo - TBD      Ashu Diane MD

## 2022-10-16 NOTE — PROGRESS NOTES
Nephrology Consult Note        [facility]    [facility]       Phone: [phone]                                                  10/16/2022 7:25 AM     Patient: Andrew Gordillo [id number]  [id number]  Date of Admit: 10/14/2022 LOS: 1 days        Interval History and Plan:  Patient comfortable on room air  Oriented x 3  Tolerated first dialysis well  K is low 2.9  Bicarbonate OK  Hb better  No SOB on room air  BP remain elevated      No urgency for dialysis now. Will plan for 2nd session tomorrow. Change metoprolol to carvedilol  Hydralazine TID  Increase Amlodipine  Hold Lasix/Torsemide for now given low K  Replete potassium   Monitor BP and will adjust medications further   Continue Calcitriol  Monitor Hb and transfuse as needed  GI evaluation as patient reported black stools  Avoid nephrotoxins  Monitor input, output and weight  Monitor labs and vitals closely  Renally dose all medications        Thank you for allowing us to participate in this patient's care    In case of any question please call us at our 24 hour answering service [phone] or from 7 AM to 5 PM via [employer] or cell number    David Cain MD  [facility]  [employer] [facility] [address], [address]  Fax: [phone]  Office: [phone]       Assessment & Plan     Renal function:    Chronic Kidney Disease 5/ESRD  Cr 13    Started dialysis      Electrolytes:  Lab Results   Component Value Date    CREATININE 12.3 (HH) 10/16/2022    BUN 87 (HH) 10/16/2022     10/16/2022    K 2.9 (LL) 10/16/2022     10/16/2022    CO2 22 10/16/2022            # CKD- MBD:   Secondary hyperparathyroidism due to renal failure  Monitor Phos level while here. Lab Results   Component Value Date    .5 (H) 10/14/2022    CALCIUM 8.0 (L) 10/16/2022    PHOS 6.9 (H) 10/16/2022         Acid/Base status:  Mild metabolic acidosis > better    Volume status/BP:  BP elevated.      Volume overloaded with chronic edema but comfortable on room air. No urgency for UF      Intake/Output Summary (Last 24 hours) at 10/16/2022 0725  Last data filed at 10/16/2022 0000  Gross per 24 hour   Intake 890.83 ml   Output 900 ml   Net -9.17 ml           Hematology:   CKD related anemia  Goal Hgb 10-11    Lab Results   Component Value Date    IRON 18 (L) 10/15/2022    TIBC 242 (L) 10/15/2022    FERRITIN 130.2 10/15/2022     Lab Results   Component Value Date    WBC 9.2 10/16/2022    HGB 8.2 (L) 10/16/2022    HCT 23.7 (L) 10/16/2022    MCV 88.7 10/16/2022     10/16/2022             Reason for Consult and Chief Complaint     Reason for consult: ESRD    Chief complaint:   Chief Complaint   Patient presents with    Leg Swelling     C/o on going leg swelling for a \"few weeks\" saw PCP and sent to get blood work today    Anemia     PCP called tonight and sent to ED d/t blood count low, patient states told \"5.0\" patient states sob, and dark stools for few days        History of Present Illness   Abdirizak Drummond is a 55 y.o. with PMH significant for CKD 5 was admitted with leg swelling, exertional SOB and low Hb. No chest pain. No SOB at rest. Patient also gaining weight. Patient reported some black stools. No focal weakness. Review of Systems   Positive in bold or unable to assess     GEN: Fever, chills, night sweats. HEENT: Changes in vision, sore throat, rhinorrhea   CVS: Chest pain, palpitations,swelling or edema in legs  Pulmonary: Cough, hemoptysis, SOB   GI: Nausea, vomiting, diarrhea, constipation, abdominal pain  : Bladder incontinence, dysuria,hematuria. MSK: Muscle or joint or bone pains  Skin: Rashes, ulcers, skin thickness  CNS: Headache, dizziness, confusion, focal weakness, seizure. Psych: Anxiety, agitation, depression. Reviewed all 12 systems, negative except as above.      Past Medical History     Past Medical History:   Diagnosis Date    Anxiety 3/28/2017    CKD (chronic kidney disease) 6/16/2017 Hyperlipidemia LDL goal <100 6/16/2017    Hypertension     Obesity (BMI 30.0-34.9) 6/16/2017         Past Surgical History     History reviewed. No pertinent surgical history. Family History     History reviewed. No pertinent family history. Social History     Social History     Tobacco Use    Smoking status: Some Days     Years: 4.00     Types: Cigarettes    Smokeless tobacco: Never    Tobacco comments:      2 a week   Substance Use Topics    Alcohol use: No          Past medical, family, and social histories were reviewed as previously documented. Updates were made as necessary. Inpatient Medications and Allergies       Scheduled Meds:   potassium chloride  10 mEq IntraVENous Q1H    potassium chloride  40 mEq Oral Once    sodium chloride flush  5-40 mL IntraVENous 2 times per day    pantoprazole  40 mg Oral BID AC    calcitRIOL  0.25 mcg Oral Daily    carvedilol  12.5 mg Oral BID WC    amLODIPine  10 mg Oral Daily    hydrALAZINE  50 mg Oral 3 times per day       Allergies: No Known Allergies      Vital Signs     Vitals:    10/16/22 0400   BP: (!) 179/106   Pulse: 86   Resp: 25   Temp:    SpO2:          Intake/Output Summary (Last 24 hours) at 10/16/2022 0725  Last data filed at 10/16/2022 0000  Gross per 24 hour   Intake 890.83 ml   Output 900 ml   Net -9.17 ml           Physical Exam     General appearance: NAD  Head: Normocephalic, without obvious abnormality, atraumatic   Mouth: Moist mucous membrane  Neck: Supple. Lungs: Good air entry bilaterally. No respiratory distress on RA.    Heart: S1, S2.  Abdomen: soft, non-tender non-distended  Extremities:Chronic appearing significant leg edema  Skin: No concerning rashes noted  Psych: good eye contact, normal affect  Neuro: AAO x 3, non focal.       Laboratory Data     Please see above     Diagnostic Studies   Pertinent images reviewed

## 2022-10-16 NOTE — PROGRESS NOTES
NICU Discharge Note & Instructions    If you have questions about your discharge please call the [facility] at [phone].    Yo Allen is a 1 week old infant, delivered at Gestational Age: 35w0d.  Discharged to home, accompanied by mother.  Discharge date: 2021      Age: 11 Days  Corrected age:36w4d  Discharge weight: 5 lb 13.8 oz (2660 g)  Discharge length:18\" (45.7 cm) (21 1415)    Discharge head measurement: 35 cm (13.78\") (21 1415)    Birth Information  Date and time of delivery: 2021  9:48 AM  Delivery method: , Low Transverse [251]  Apgars at 1 and 5 minutes: 6 & 9  Birth weight: 5 lb 7.1 oz (2470 g)    Birth length: 18.5\"  Birth head measurement: 33.5 cm    Lab & Screening Information:  Infant blood type: No results found for: ABR   Bilirubin   Bilirubin, Total (mg/dL)   Date Value   2021 (H)         screen: Done   Hearing test: Done  Hearing Test Results: Pass R;Pass L (21 0000)      Immunizations Given:   Most Recent Immunizations   Administered Date(s) Administered   • Hep B, adolescent or pediatric 2021     Hepatitis B.    Feeding Method:   Baby is currently being fed with formula, Similac Sensitive, 20 amarilis/oz kcal per ounce with no additives, 2-2.5 oz every 3 hours.  Follow the instructions you were given for preparation.    What to expect:  · Expect about 6-8 wet diapers daily.  · The urine should be pale in color and have little odor.  · Most babies have a bowel movement after every breastfeeding or at least several times a day.    Special considerations for Preemies:    Once your baby is ready to go home, preemies are much like other babies. However you will need to be extra careful for certain things:  · Protect your baby from infections. You should wash your hands often with soap and water, and so should anybody who takes care of your baby. Limit contact with visitors, and avoid crowded public areas. If people in  Treatment time: 1.5hrs    Net UF: 500ml    Pre weight: 119.8kg  Post weight: 119.3kg      Access used: RIJ  Access function: well    Medications or blood products given: 2 Units PRBC    Regular outpatient schedule: tbd    Summary of response to treatment: pt tolerated tx well     Copy of dialysis treatment record placed in chart, to be scanned into EMR. the household are ill, try to limit their contact with the baby.  · Make your house and car 'no smoking zones.' Anybody in the household who smokes should quit. Visitors, or household members who can't quit, should smoke only outside, away from doors and windows.  · If your baby has an apnea monitor, make sure you can hear it from every room in the house. Other small radio frequency devices (iPods/cell phones) may cause interference, so keep these devices 3 feet away from the apnea monitor.  · Feel free to take your baby outside, but avoid long exposure to drafts or direct sunlight.    Education materials have been given during this hospital stay, and reviewed as needed.  Call the [facility]  at [phone] if you have any questions about the instructions you were given at discharge.     When To Call The Doctor:    Call your pediatrician or family doctor if your baby:  · Has a fever of 100 degrees F or higher  · Is feeding poorly  · Is having difficulty breathing  · Is extremely irritable  · Is listless and tired  · Is forcefully vomiting  · Falls from bed or table  · Is dropped or severely shaken  · Cries constantly  · Has an unusual rash  · Has watery runny stools  · Has mucous or blood in stools    In case you need to call the doctor with any of the above:  · Take your baby's temperature and write it down.  · Know how much and how many feedings she has had that day.  · Note amount, color, consistency, of urine and stools.  · Note any changes in your baby's behavior such as sleepy, very fussy or less active.    Valuables and Belongings sent with patient.   The instructions in this document have been reviewed with mother, verbalized understanding.    Rossy Sigala, RN  2021 at 8:48 AM

## 2022-10-16 NOTE — PROGRESS NOTES
Pt up in chair HR 83, O2 95% room air. No c/o chest pain, dyspnea or SOB. BP elevated, 187/111. Dr. Kirti Faust made aware via secure message. No new orders. Physician stated to notify medical team if systolic goes above 565 or over 170  with symptoms of chest pain and dyspnea. Pt voiding utilizing the urinal.  Remains alert and oriented x4.

## 2022-10-16 NOTE — PROGRESS NOTES
Gastroenterology Note  Patient:   Graham Fletcher   :    1975   Facility:   [facility]   Date:     10/16/2022  Consultant:   Nahomy Perrin MD, MD      Subjective:     55 y.o. male admitted 10/14/2022 with Shortness of breath [R06.02]  Leg swelling [M79.89]  ESRD (end stage renal disease) ([facility] [N18.6]  CKD (chronic kidney disease) [N18.9]  Acute renal failure (ARF) (HCC) [N17.9]  Acute renal failure, unspecified acute renal failure type ([facility] [address] [N17.9]  Anemia, unspecified type [D64.9] and seen for anemia. .    Present  Diet Order: ADULT DIET; Regular; Low Sodium (2 gm); Low Potassium (Less than 3000 mg/day);  Low Phosphorus (Less than 1000 mg)      Current Medications include:   Scheduled Meds:   heparin (porcine)  5,000 Units SubCUTAneous 3 times per day    sodium chloride flush  5-40 mL IntraVENous 2 times per day    pantoprazole  40 mg Oral BID AC    calcitRIOL  0.25 mcg Oral Daily    carvedilol  12.5 mg Oral BID WC    amLODIPine  10 mg Oral Daily    hydrALAZINE  50 mg Oral 3 times per day     Continuous Infusions:   sodium chloride      sodium chloride      sodium chloride       PRN Meds:.sodium chloride, sodium chloride flush, sodium chloride, ondansetron **OR** ondansetron, polyethylene glycol, acetaminophen **OR** acetaminophen, hydrOXYzine HCl, sodium chloride    Allergies: No Known Allergies    Objective:   Vital Signs:  Temp (24hrs), Av.2 °F (36.8 °C), Min:98 °F (36.7 °C), Max:98.4 °F (44.9 °C)     Systolic (73JET), LJM:869 , Min:154 , DBF:811      Diastolic (75GOO), JQR:573, Min:94, Max:128     Pulse  Av.2  Min: 71  Max: 93  BP (!) 168/124   Pulse 85   Temp 98.4 °F (36.9 °C) (Oral)   Resp 28   Ht 6' 2\" (1.88 m)   Wt 264 lb 1.8 oz (119.8 kg)   SpO2 92%   BMI 33.91 kg/m²      Physical Exam:   General appearance: alert and appears stated age  Lungs: clear to auscultation bilaterally  Heart: regular rate and rhythm, S1, S2 normal, no murmur, click, rub or gallop  Abdomen: soft, non-tender; bowel sounds normal; no masses,  no organomegaly    Lab and Imaging Review   Recent Labs     10/14/22  1339 10/15/22  0010 10/15/22  0419 10/15/22  0827 10/15/22  1615 10/15/22  2148 10/16/22  0607   WBC 8.4 9.9  --   --   --   --  9.2   HGB 5.7* 5.7* 5.6* 6.6* 5.7* 8.1* 8.2*   MCV 89.9 88.7  --   --   --   --  88.7    281  --   --   --   --  234   INR  --  1.29*  --   --   --   --   --     138  --   --   --   --  138   K 4.1 3.6  --   --   --   --  2.9*    98*  --   --   --   --  100   CO2 18* 18*  --   --   --   --  22   * 110*  --   --   --   --  87*   CREATININE 14.0* 13.8*  --   --   --   --  12.3*   GLUCOSE 116* 115*  --   --   --   --  121*   CALCIUM 8.3 8.0*  --   --   --   --  8.0*   PROT 6.5 6.7  --   --   --   --   --    LABALBU 3.7 3.7  --   --   --   --   --    AST 13* 13*  --   --   --   --   --    ALT 17 16  --   --   --   --   --    ALKPHOS 83 84  --   --   --   --   --    BILITOT 0.3 <0.2  --   --   --   --   --    BILIDIR  --  <0.2  --   --   --   --   --    LIPASE  --  26.0  --   --   --   --   --    MG  --  2.80*  --   --   --   --  2.60*       Assessment:     Patient Active Problem List    Diagnosis Date Noted    Anemia 10/16/2022    Leg swelling 10/16/2022    Acute renal failure (ARF) ([facility] 10/15/2022    ESRD (end stage renal disease) ([facility] 10/15/2022    Poor compliance 12/07/2020    Elevated glucose 12/07/2020    Hyperlipidemia LDL goal <100 06/16/2017    CKD (chronic kidney disease) 06/16/2017    Obesity (BMI 30.0-34.9) 06/16/2017    Anxiety 03/28/2017    Essential hypertension 12/14/2016       Plan:   No bleeding. Is passing brown stool. Blood count is good. Getting dialysis. No indication for endoscopy at this time.

## 2022-10-17 ENCOUNTER — APPOINTMENT (OUTPATIENT)
Dept: INTERVENTIONAL RADIOLOGY/VASCULAR | Age: 47
DRG: 286 | End: 2022-10-17

## 2022-10-17 LAB
ANION GAP SERPL CALCULATED.3IONS-SCNC: 17 MMOL/L (ref 3–16)
BASOPHILS ABSOLUTE: 0 K/UL (ref 0–0.2)
BASOPHILS RELATIVE PERCENT: 0.2 %
BUN BLDV-MCNC: 84 MG/DL (ref 7–20)
CALCIUM SERPL-MCNC: 8.6 MG/DL (ref 8.3–10.6)
CHLORIDE BLD-SCNC: 100 MMOL/L (ref 99–110)
CO2: 22 MMOL/L (ref 21–32)
CREAT SERPL-MCNC: 12.2 MG/DL (ref 0.9–1.3)
EOSINOPHILS ABSOLUTE: 0.4 K/UL (ref 0–0.6)
EOSINOPHILS RELATIVE PERCENT: 4.1 %
GFR AFRICAN AMERICAN: 5
GFR NON-AFRICAN AMERICAN: 4
GLUCOSE BLD-MCNC: 128 MG/DL (ref 70–99)
HCT VFR BLD CALC: 23.9 % (ref 40.5–52.5)
HCT VFR BLD CALC: 24 % (ref 40.5–52.5)
HEMOGLOBIN: 8.1 G/DL (ref 13.5–17.5)
HEMOGLOBIN: 8.1 G/DL (ref 13.5–17.5)
LYMPHOCYTES ABSOLUTE: 1.1 K/UL (ref 1–5.1)
LYMPHOCYTES RELATIVE PERCENT: 12.4 %
MAGNESIUM: 2.4 MG/DL (ref 1.8–2.4)
MCH RBC QN AUTO: 30 PG (ref 26–34)
MCHC RBC AUTO-ENTMCNC: 33.5 G/DL (ref 31–36)
MCV RBC AUTO: 89.4 FL (ref 80–100)
MONOCYTES ABSOLUTE: 0.8 K/UL (ref 0–1.3)
MONOCYTES RELATIVE PERCENT: 9.1 %
NEUTROPHILS ABSOLUTE: 6.7 K/UL (ref 1.7–7.7)
NEUTROPHILS RELATIVE PERCENT: 74.2 %
PDW BLD-RTO: 15.8 % (ref 12.4–15.4)
PHOSPHORUS: 7.8 MG/DL (ref 2.5–4.9)
PLATELET # BLD: 232 K/UL (ref 135–450)
PMV BLD AUTO: 8.7 FL (ref 5–10.5)
POTASSIUM REFLEX MAGNESIUM: 3.2 MMOL/L (ref 3.5–5.1)
RBC # BLD: 2.69 M/UL (ref 4.2–5.9)
SODIUM BLD-SCNC: 139 MMOL/L (ref 136–145)
URINE CULTURE, ROUTINE: NORMAL
WBC # BLD: 9.1 K/UL (ref 4–11)

## 2022-10-17 PROCEDURE — 99255 IP/OBS CONSLTJ NEW/EST HI 80: CPT | Performed by: INTERNAL MEDICINE

## 2022-10-17 PROCEDURE — 85025 COMPLETE CBC W/AUTO DIFF WBC: CPT

## 2022-10-17 PROCEDURE — 02HV33Z INSERTION OF INFUSION DEVICE INTO SUPERIOR VENA CAVA, PERCUTANEOUS APPROACH: ICD-10-PCS | Performed by: RADIOLOGY

## 2022-10-17 PROCEDURE — 36415 COLL VENOUS BLD VENIPUNCTURE: CPT

## 2022-10-17 PROCEDURE — 2060000000 HC ICU INTERMEDIATE R&B

## 2022-10-17 PROCEDURE — 6360000002 HC RX W HCPCS: Performed by: STUDENT IN AN ORGANIZED HEALTH CARE EDUCATION/TRAINING PROGRAM

## 2022-10-17 PROCEDURE — 77001 FLUOROGUIDE FOR VEIN DEVICE: CPT

## 2022-10-17 PROCEDURE — 2580000003 HC RX 258

## 2022-10-17 PROCEDURE — 83735 ASSAY OF MAGNESIUM: CPT

## 2022-10-17 PROCEDURE — 84100 ASSAY OF PHOSPHORUS: CPT

## 2022-10-17 PROCEDURE — 0JH63XZ INSERTION OF TUNNELED VASCULAR ACCESS DEVICE INTO CHEST SUBCUTANEOUS TISSUE AND FASCIA, PERCUTANEOUS APPROACH: ICD-10-PCS | Performed by: RADIOLOGY

## 2022-10-17 PROCEDURE — 6370000000 HC RX 637 (ALT 250 FOR IP)

## 2022-10-17 PROCEDURE — 6370000000 HC RX 637 (ALT 250 FOR IP): Performed by: STUDENT IN AN ORGANIZED HEALTH CARE EDUCATION/TRAINING PROGRAM

## 2022-10-17 PROCEDURE — 85018 HEMOGLOBIN: CPT

## 2022-10-17 PROCEDURE — 80048 BASIC METABOLIC PNL TOTAL CA: CPT

## 2022-10-17 PROCEDURE — 36558 INSERT TUNNELED CV CATH: CPT

## 2022-10-17 PROCEDURE — C1881 DIALYSIS ACCESS SYSTEM: HCPCS

## 2022-10-17 PROCEDURE — 2580000003 HC RX 258: Performed by: STUDENT IN AN ORGANIZED HEALTH CARE EDUCATION/TRAINING PROGRAM

## 2022-10-17 PROCEDURE — 2500000003 HC RX 250 WO HCPCS

## 2022-10-17 PROCEDURE — 36592 COLLECT BLOOD FROM PICC: CPT

## 2022-10-17 PROCEDURE — 6360000002 HC RX W HCPCS

## 2022-10-17 PROCEDURE — 99152 MOD SED SAME PHYS/QHP 5/>YRS: CPT

## 2022-10-17 PROCEDURE — 85014 HEMATOCRIT: CPT

## 2022-10-17 PROCEDURE — 90935 HEMODIALYSIS ONE EVALUATION: CPT

## 2022-10-17 PROCEDURE — C1894 INTRO/SHEATH, NON-LASER: HCPCS

## 2022-10-17 RX ORDER — LOSARTAN POTASSIUM 100 MG/1
100 TABLET ORAL DAILY
Status: DISCONTINUED | OUTPATIENT
Start: 2022-10-17 | End: 2022-10-21 | Stop reason: HOSPADM

## 2022-10-17 RX ORDER — POTASSIUM CHLORIDE 20 MEQ/1
40 TABLET, EXTENDED RELEASE ORAL ONCE
Status: COMPLETED | OUTPATIENT
Start: 2022-10-17 | End: 2022-10-17

## 2022-10-17 RX ADMIN — CARVEDILOL 12.5 MG: 12.5 TABLET, FILM COATED ORAL at 17:45

## 2022-10-17 RX ADMIN — PANTOPRAZOLE SODIUM 40 MG: 40 TABLET, DELAYED RELEASE ORAL at 17:45

## 2022-10-17 RX ADMIN — HEPARIN SODIUM 5000 UNITS: 5000 INJECTION INTRAVENOUS; SUBCUTANEOUS at 06:22

## 2022-10-17 RX ADMIN — SODIUM CHLORIDE, PRESERVATIVE FREE 10 ML: 5 INJECTION INTRAVENOUS at 08:31

## 2022-10-17 RX ADMIN — IRON SUCROSE 100 MG: 20 INJECTION, SOLUTION INTRAVENOUS at 17:49

## 2022-10-17 RX ADMIN — LOSARTAN POTASSIUM 100 MG: 100 TABLET, FILM COATED ORAL at 08:30

## 2022-10-17 RX ADMIN — AMLODIPINE BESYLATE 10 MG: 10 TABLET ORAL at 08:30

## 2022-10-17 RX ADMIN — CARVEDILOL 12.5 MG: 12.5 TABLET, FILM COATED ORAL at 08:30

## 2022-10-17 RX ADMIN — SODIUM CHLORIDE, PRESERVATIVE FREE 10 ML: 5 INJECTION INTRAVENOUS at 21:22

## 2022-10-17 RX ADMIN — CALCITRIOL CAPSULES 0.25 MCG 0.25 MCG: 0.25 CAPSULE ORAL at 08:30

## 2022-10-17 RX ADMIN — HYDRALAZINE HYDROCHLORIDE 50 MG: 50 TABLET, FILM COATED ORAL at 06:22

## 2022-10-17 RX ADMIN — POTASSIUM CHLORIDE 40 MEQ: 1500 TABLET, EXTENDED RELEASE ORAL at 08:30

## 2022-10-17 RX ADMIN — PANTOPRAZOLE SODIUM 40 MG: 40 TABLET, DELAYED RELEASE ORAL at 06:22

## 2022-10-17 NOTE — PROGRESS NOTES
Nephrology Consult Note        [facility]    [email or web address]       Phone: [phone]                                                  10/17/2022 7:57 AM     Patient: Jorge Horne [id number]  [id number]  Date of Admit: 10/14/2022 LOS: 2 days        Interval History and Plan:  Seen during dialysis  Patient comfortable on room air  No complaints. Oriented x 3  K is low 3.2  Bicarbonate OK  Hb stable   No SOB on room air  BP remain elevated        2nd Dialysis session today for 2. 5 hours with 1 liter UF as tolerated  Dialysis with 4 K bath. Continue Carvedilol and Amlodipine  D/C Hydralazine and start Losartan 100 mg daily  Hold Torsemide for now given low K  IR consulted for [facility] placement. Monitor BP and will adjust medications further   Continue Calcitriol  Monitor Hb and transfuse as needed. Start IV iron. Will start Epogen once BP is better controlled. GI evaluation as patient reported black stools  Avoid nephrotoxins  Monitor input, output and weight  Monitor labs and vitals closely  Renally dose all medications  Requested [facility] for dialysis placement. D/W patient and he agree with plan  Discussed dialysis prescription with dialysis nurse at bedside.      Thank you for allowing us to participate in this patient's care    In case of any question please call us at our 24 hour answering service [phone] or from 7 AM to 5 PM via [employer] or cell number    Abby Gee MD  [facility]  [address] [address]  Fax: [phone]  Office: [phone]       Assessment & Plan     Renal function:    Chronic Kidney Disease 5/ESRD  Cr 13    Started dialysis      Electrolytes:  Lab Results   Component Value Date    CREATININE 12.2 ([facility]) 10/17/2022    BUN 84 ([facility]) 10/17/2022     10/17/2022    K 3.2 (L) 10/17/2022     10/17/2022    CO2 22 10/17/2022            # CKD- MBD:   Secondary hyperparathyroidism due to renal failure  Monitor Phos level while here. Lab Results   Component Value Date    .5 (H) 10/14/2022    CALCIUM 8.6 10/17/2022    PHOS 7.8 (H) 10/17/2022         Acid/Base status:  Mild metabolic acidosis > manage with dialysis. Volume status/BP:  BP elevated. Volume overloaded with chronic edema but comfortable on room air. No urgency for aggressive UF      Intake/Output Summary (Last 24 hours) at 10/17/2022 0757  Last data filed at 10/16/2022 1700  Gross per 24 hour   Intake 942 ml   Output 890 ml   Net 52 ml           Hematology:   CKD related anemia  Goal Hgb 10-11    Lab Results   Component Value Date    IRON 18 (L) 10/15/2022    TIBC 242 (L) 10/15/2022    FERRITIN 130.2 10/15/2022     Lab Results   Component Value Date    WBC 9.1 10/17/2022    HGB 8.1 (L) 10/17/2022    HCT 24.0 (L) 10/17/2022    MCV 89.4 10/17/2022     10/17/2022             Reason for Consult and Chief Complaint     Reason for consult: ESRD    Chief complaint:   Chief Complaint   Patient presents with    Leg Swelling     C/o on going leg swelling for a \"few weeks\" saw PCP and sent to get blood work today    Anemia     PCP called tonight and sent to ED d/t blood count low, patient states told \"5.0\" patient states sob, and dark stools for few days        History of Present Illness   Meme Cali is a 55 y.o. with PMH significant for CKD 5 was admitted with leg swelling, exertional SOB and low Hb. No chest pain. No SOB at rest. Patient also gaining weight. Patient reported some black stools. No focal weakness. Review of Systems   Positive in bold or unable to assess     GEN: Fever, chills, night sweats. HEENT: Changes in vision, sore throat, rhinorrhea   CVS: Chest pain, palpitations,swelling or edema in legs  Pulmonary: Cough, hemoptysis, SOB   GI: Nausea, vomiting, diarrhea, constipation, abdominal pain  : Bladder incontinence, dysuria,hematuria.    MSK: Muscle or joint or bone pains  Skin: Rashes, ulcers, skin thickness  CNS: Headache, dizziness, confusion, focal weakness, seizure. Psych: Anxiety, agitation, depression. Reviewed all 12 systems, negative except as above. Past Medical History     Past Medical History:   Diagnosis Date    Anxiety 3/28/2017    CKD (chronic kidney disease) 6/16/2017    Hyperlipidemia LDL goal <100 6/16/2017    Hypertension     Obesity (BMI 30.0-34.9) 6/16/2017         Past Surgical History     History reviewed. No pertinent surgical history. Family History     History reviewed. No pertinent family history. Social History     Social History     Tobacco Use    Smoking status: Some Days     Years: 4.00     Types: Cigarettes    Smokeless tobacco: Never    Tobacco comments:      2 a week   Substance Use Topics    Alcohol use: No          Past medical, family, and social histories were reviewed as previously documented. Updates were made as necessary. Inpatient Medications and Allergies       Scheduled Meds:   potassium chloride  40 mEq Oral Once    heparin (porcine)  5,000 Units SubCUTAneous 3 times per day    sodium chloride flush  5-40 mL IntraVENous 2 times per day    pantoprazole  40 mg Oral BID AC    calcitRIOL  0.25 mcg Oral Daily    carvedilol  12.5 mg Oral BID WC    amLODIPine  10 mg Oral Daily    hydrALAZINE  50 mg Oral 3 times per day       Allergies: No Known Allergies      Vital Signs     Vitals:    10/17/22 0600   BP: (!) 190/105   Pulse: 77   Resp: 16   Temp:    SpO2: 98%         Intake/Output Summary (Last 24 hours) at 10/17/2022 0757  Last data filed at 10/16/2022 1700  Gross per 24 hour   Intake 942 ml   Output 890 ml   Net 52 ml           Physical Exam     General appearance: NAD  Head: Normocephalic, without obvious abnormality, atraumatic   Mouth: Moist mucous membrane  Neck: Supple. Lungs: Good air entry bilaterally. No respiratory distress on RA.    Heart: S1, S2.  Abdomen: soft, non-tender non-distended  Extremities:Chronic appearing significant leg edema  Skin: No concerning rashes noted  Psych: good eye contact, normal affect  Neuro: AAO x 3, non focal.       Laboratory Data     Please see above     Diagnostic Studies   Pertinent images reviewed

## 2022-10-17 NOTE — CARE COORDINATION
Case Management Assessment  Initial Evaluation    Date/Time of Evaluation: 10/17/2022 4:19 PM  Assessment Completed by: Pastor Nmia RN    If patient is discharged prior to next notation, then this note serves as note for discharge by case management. Patient Name: William Tipton                   YOB: 1975  Diagnosis: Shortness of breath [R06.02]  Leg swelling [M79.89]  ESRD (end stage renal disease) ([facility] [address] [N18.6]  CKD (chronic kidney disease) [N18.9]  Acute renal failure (ARF) (HCC) [N17.9]  Acute renal failure, unspecified acute renal failure type ([facility] [address] [N17.9]  Anemia, unspecified type [D64.9]                   Date / Time: 10/14/2022 11:26 PM    Patient Admission Status: Inpatient   Readmission Risk (Low < 19, Mod (19-27), High > 27): Readmission Risk Score: 19.3    Current PCP: Noel Rosado MD  PCP verified by ? No    Chart Reviewed: Yes      History Provided by: Patient  Patient Orientation: Alert and Oriented    Patient Cognition: Alert    Hospitalization in the last 30 days (Readmission):  No    If yes, Readmission Assessment in CM Navigator will be completed. Advance Directives:      Code Status: Full Code   Patient's Primary Decision Maker is: Legal Next of Kin      Discharge Planning:    Patient lives with: Spouse/Significant Other Type of Home:    Primary Care Giver: Spouse  Patient Support Systems include: Spouse/Significant Other   Current Financial resources:    Current community resources:    Current services prior to admission:              Current DME:              Type of Home Care services:  None    ADLS  Prior functional level: Independent in ADLs/IADLs  Current functional level:      PT AM-PAC:   /24  OT AM-PAC:   /24    Family can provide assistance at DC: Yes  Would you like Case Management to discuss the discharge plan with any other family members/significant others, and if so, who?     Plans to Return to Present Housing: Unknown at present  Other Identified Issues/Barriers to RETURNING to current housing: none  Potential Assistance needed at discharge:              Potential DME:    Patient expects to discharge to: [address] [facility] for transportation at discharge:      Financial    Payor: /     Does insurance require precert for SNF: Yes    Potential assistance Purchasing Medications:    Meds-to-Beds request:        [facility], [address] [facility] [address]  Phone: [phone] Fax: [phone]    [id number] [place], [address] [place] [address]. - P [phone] - F [phone]  [address] [place] [address]  Phone: [phone] Fax: [phone]      Notes:    Factors facilitating achievement of predicted outcomes: Family support    Barriers to discharge: Medical complications and No insurance coverage    Additional Case Management Notes:   Patient is from home with spouse, independent pta, no health insurance. Patient does not anticipate any CM needs. Family to transport home. The Plan for Transition of Care is related to the following treatment goals of Shortness of breath [R06.02]  Leg swelling [M79.89]  ESRD (end stage renal disease) ([facility] [address] [N18.6]  CKD (chronic kidney disease) [N18.9]  Acute renal failure (ARF) (HCC) [N17.9]  Acute renal failure, unspecified acute renal failure type ([address] [N17.9]  Anemia, unspecified type [U21.6]    IF APPLICABLE: The Patient and/or patient representative Jhonny Alvarez and his family were provided with a choice of provider and agrees with the discharge plan. Freedom of choice list with basic dialogue that supports the patient's individualized plan of care/goals and shares the quality data associated with the providers was provided to:     Patient Representative Name:       The Patient and/or Patient Representative Agree with the Discharge Plan?       Eduin Huang RN  Case Management Department  Ph: [phone] Fax: [phone]

## 2022-10-17 NOTE — H&P
Patient:  Liv Sorensen   :   1975      Relevant clinical history, particularly as it involves the pending procedure, was reviewed and discussed. The procedure including risks and benefits was discussed at length with the patient (or designated family member) and all questions were answered. Informed consent to proceed with the procedure was given. Vital signs were monitored and documented by the Radiology nurse. Targeted physical examination  Heart : regular rate and rhythm  Lungs : clear, breathing easily  Condition : stable    [facility] nurses notes reviewed and agreed. Past Medical History:        Diagnosis Date    Anxiety 3/28/2017    CKD (chronic kidney disease) 2017    Hyperlipidemia LDL goal <100 2017    Hypertension     Obesity (BMI 30.0-34.9) 2017       Past Surgical History:     History reviewed. No pertinent surgical history. Allergies:  Patient has no known allergies. Medications:   Home Meds  No current facility-administered medications on file prior to encounter. Current Outpatient Medications on File Prior to Encounter   Medication Sig Dispense Refill    furosemide (LASIX) 40 MG tablet Take 1 tablet by mouth daily 60 tablet 3    hydrALAZINE (APRESOLINE) 100 MG tablet Take 1 tablet by mouth 2 times daily 180 tablet 1    amLODIPine (NORVASC) 5 MG tablet Take 1 tablet by mouth daily 90 tablet 1    metoprolol succinate (TOPROL XL) 100 MG extended release tablet Take 1 tablet by mouth in the morning.  90 tablet 0       Current Meds  losartan (COZAAR) tablet 100 mg, Daily  iron sucrose (VENOFER) injection 100 mg, Once in dialysis  heparin (porcine) injection 5,000 Units, 3 times per day  labetalol (NORMODYNE;TRANDATE) injection 20 mg, Q4H PRN  0.9 % sodium chloride infusion, PRN  sodium chloride flush 0.9 % injection 5-40 mL, 2 times per day  sodium chloride flush 0.9 % injection 5-40 mL, PRN  0.9 % sodium chloride infusion, PRN  ondansetron (ZOFRAN-ODT) disintegrating tablet 4 mg, Q8H PRN   Or  ondansetron (ZOFRAN) injection 4 mg, Q6H PRN  polyethylene glycol (GLYCOLAX) packet 17 g, Daily PRN  acetaminophen (TYLENOL) tablet 650 mg, Q6H PRN   Or  acetaminophen (TYLENOL) suppository 650 mg, Q6H PRN  pantoprazole (PROTONIX) tablet 40 mg, BID AC  hydrOXYzine HCl (ATARAX) tablet 10 mg, TID PRN  calcitRIOL (ROCALTROL) capsule 0.25 mcg, Daily  0.9 % sodium chloride infusion, PRN  carvedilol (COREG) tablet 12.5 mg, BID WC  amLODIPine (NORVASC) tablet 10 mg, Daily          ASA 2 - Patient with mild systemic disease with no functional limitations    II (soft palate, uvula, fauces visible)    Activity:  2 - Able to move 4 extremities voluntarily on command  Respiration:  2 - Able to breathe deeply and cough freely  Circulation:  2 - BP+/- 20mmHg of normal  Consciousness:  2 - Fully awake  Oxygen Saturation (color):  2 - Able to maintain oxygen saturation >92% on room air    Sedation : Moderate sedation planned

## 2022-10-17 NOTE — PROCEDURES
IR Brief Postoperative Note    Jose Banks  YOB: 1975  [id number]    Pre-operative Diagnosis: esrd    Post-operative Diagnosis: Same    Procedure: tunneled right IJ dialysis cath, ok for use    Anesthesia: mod    Surgeons/Assistants: benigno    Estimated Blood Loss: Minimal    Complications: none    Specimens: were not obtained    See full procedure dictation to follow      Gabriela Hamilton MD MD  10/17/2022

## 2022-10-17 NOTE — PROGRESS NOTES
Hospitalist Progress Note      PCP: La Nena Mauricio MD    Date of Admission: 10/14/2022    Chief Complaint: Leg Swelling    Hospital Course: Patient is a 56 yo M w/ PMH of CKD, HTN, HLD who presented on 10/15 with worsening leg swelling ans was found to be in acute renal failure, as well as severely anemic to 5.7. He was admitted to ICU initially for close monitoring and started on HD. Required total of 3 units PRBCs but Hgb now stable in 8's. GI evaluated patient and did not suspect active bleeding; thought anemia more chronic and related to renal disease. Transferred out of ICU later in day on 10/15. Subjective: No acute clinical changes reported overnight. Afebrile and hemodynamically stable. Overall feels well this am and has no new symptomatic complaints. Tolerated 2nd run of HD today. Medications:  Reviewed    Infusion Medications    sodium chloride      sodium chloride      sodium chloride       Scheduled Medications    losartan  100 mg Oral Daily    heparin (porcine)  5,000 Units SubCUTAneous 3 times per day    sodium chloride flush  5-40 mL IntraVENous 2 times per day    pantoprazole  40 mg Oral BID AC    calcitRIOL  0.25 mcg Oral Daily    carvedilol  12.5 mg Oral BID WC    amLODIPine  10 mg Oral Daily     PRN Meds: labetalol, sodium chloride, sodium chloride flush, sodium chloride, ondansetron **OR** ondansetron, polyethylene glycol, acetaminophen **OR** acetaminophen, hydrOXYzine HCl, sodium chloride      Intake/Output Summary (Last 24 hours) at 10/17/2022 0858  Last data filed at 10/16/2022 1700  Gross per 24 hour   Intake 942 ml   Output 890 ml   Net 52 ml       Physical Exam Performed:    BP (!) 190/105   Pulse 77   Temp 98.3 °F (36.8 °C) (Oral)   Resp 16   Ht 6' 2\" (1.88 m)   Wt 263 lb 14.3 oz (119.7 kg)   SpO2 98%   BMI 33.88 kg/m²     General appearance: No apparent distress, appears stated age and cooperative. HEENT: Pupils equal, round, and reactive to light. Conjunctivae/corneas clear. Neck: Supple, with full range of motion. No jugular venous distention. Trachea midline. Respiratory:  Normal respiratory effort. Clear to auscultation, bilaterally without Rales/Wheezes/Rhonchi. Cardiovascular: Regular rate and rhythm with normal S1/S2 without murmurs, rubs or gallops. Abdomen: Soft, non-tender, non-distended with normal bowel sounds. Musculoskeletal: 1+ pitting edema up to calves bilaterally. Full ROM and no signs of trauma  Skin: Skin color, texture, turgor normal.  No rashes or lesions. Neurologic:  Neurovascularly intact without any focal sensory/motor deficits. Cranial nerves: II-XII intact, grossly non-focal.  Psychiatric: Alert and oriented, thought content appropriate, normal insight  Capillary Refill: Brisk, 3 seconds, normal   Peripheral Pulses: +2 palpable, equal bilaterally       Labs:   Recent Labs     10/15/22  0010 10/15/22  0419 10/16/22  0607 10/16/22  1904 10/17/22  0625   WBC 9.9  --  9.2  --  9.1   HGB 5.7*   < > 8.2* 8.4* 8.1*   HCT 17.4*   < > 23.7* 25.0* 24.0*     --  234  --  232    < > = values in this interval not displayed.      Recent Labs     10/15/22  0010 10/15/22  0223 10/16/22  0607 10/17/22  0625     --  138 139   K 3.6  --  2.9* 3.2*   CL 98*  --  100 100   CO2 18*  --  22 22   *  --  87* 84*   CREATININE 13.8*  --  12.3* 12.2*   CALCIUM 8.0*  --  8.0* 8.6   PHOS  --  8.6* 6.9* 7.8*     Recent Labs     10/14/22  1339 10/15/22  0010   AST 13* 13*   ALT 17 16   BILIDIR  --  <0.2   BILITOT 0.3 <0.2   ALKPHOS 83 84     Recent Labs     10/15/22  0010   INR 1.29*     Recent Labs     10/15/22  0010   TROPONINI 0.06*       Urinalysis:      Lab Results   Component Value Date/Time    NITRU Negative 10/15/2022 09:27 PM    WBCUA 10-20 10/15/2022 09:27 PM    RBCUA 0-2 10/15/2022 09:27 PM    BLOODU SMALL 10/15/2022 09:27 PM    SPECGRAV 1.020 10/15/2022 09:27 PM    GLUCOSEU Negative 10/15/2022 09:27 PM       Radiology:  XR CHEST PORTABLE   Final Result   1. Cardiomegaly. XR CHEST (2 VW)   Final Result      Gross cardiac enlargement. .      No acute traumatic consolidation. Assessment/Plan:    Active Hospital Problems    Diagnosis     Anemia [D64.9]      Priority: Medium    Leg swelling [M79.89]      Priority: Medium    Acute renal failure (ARF) (HCC) [N17.9]      Priority: Medium    ESRD (end stage renal disease) ([facility] [address] [N18.6]      Priority: Medium    CKD (chronic kidney disease) [N18.9]     Essential hypertension [I10]        New presentation of ESRD  - Nephrology consulted  - HD Cath path placed  - HD run #1 yesterday; #2 today per Nephro  - Trend renal function labs  - Hold further diuretics     Anemia likely 2/2 CKD vs Upper GI bleed  -S/p 3 units PRBCs w/ Hgb of 8 currently  -Trend Hgb q12h today; can probably space to qd tomorrow if stable  -Protonix 40 mg PO bid  -GI consulted, no acute intervention indicated currently; will re-enngage if clinical changes  -Resume diet     Suspected new-onset CHF  Patient presented with bilateral leg swelling, weight gain, and shortness of breath on exertion. On presentation, BNP >70,000. CXR showed gross cardiac enlargement with no acute traumatic consolidation.  -Echocardiogram completed, will f/u read  -Cardiology consulted  -Strict I's and O  -Daily weights     HTN  Patient is on Lasix 40 mg, hydralazine 100 mg twice daily, amlodipine 5 mg, and metoprolol 100 mg at home  -Current regimen per Nephro recs: amlodipine 10 mg, Carvedilol 12.5 mg bid, Losartan 100 mg qd  -Will continue titrating as needed        DVT Prophylaxis: Heparin subq  Diet: ADULT DIET; Regular; Low Sodium (2 gm); Low Potassium (Less than 3000 mg/day);  Low Phosphorus (Less than 1000 mg)  Code Status: Full Code  PT/OT Eval Status: N/A    Dispo - TBD      Gwen Murillo MD

## 2022-10-17 NOTE — PROGRESS NOTES
Treatment time: 2.5 hours  Net UF: 1400 ml     Pre weight: 116.6 kg  Post weight:115.6 kg  EDW: TBD kg     To get temp line replaced with perm cath post treatment today to IR   Access used: CVC temp Rt neck    Access function: good  with  ml/min     Medications or blood products given: none     Regular outpatient schedule: tbd     Summary of response to treatment: Patient tolerated treatment well no issuese and without any complications. Patient remained stable throughout entire treatment      Report given to IR nurses , RN and copy of dialysis treatment record placed in chart, to be scanned into EMR.

## 2022-10-17 NOTE — PROGRESS NOTES
Pts vas cath site oozing, dressing changed twice, RN held pressure with stat seal twice. Vas cath continues to slowly ooze. Dr. Eli Merida notified.

## 2022-10-18 ENCOUNTER — APPOINTMENT (OUTPATIENT)
Dept: VASCULAR LAB | Age: 47
DRG: 286 | End: 2022-10-18

## 2022-10-18 LAB
ANION GAP SERPL CALCULATED.3IONS-SCNC: 15 MMOL/L (ref 3–16)
BASOPHILS ABSOLUTE: 0.1 K/UL (ref 0–0.2)
BASOPHILS RELATIVE PERCENT: 1.3 %
BUN BLDV-MCNC: 59 MG/DL (ref 7–20)
CALCIUM SERPL-MCNC: 8.2 MG/DL (ref 8.3–10.6)
CHLORIDE BLD-SCNC: 102 MMOL/L (ref 99–110)
CO2: 24 MMOL/L (ref 21–32)
CREAT SERPL-MCNC: 9.9 MG/DL (ref 0.9–1.3)
EOSINOPHILS ABSOLUTE: 0.3 K/UL (ref 0–0.6)
EOSINOPHILS RELATIVE PERCENT: 3.8 %
GFR SERPL CREATININE-BSD FRML MDRD: 6 ML/MIN/{1.73_M2}
GLUCOSE BLD-MCNC: 109 MG/DL (ref 70–99)
HCT VFR BLD CALC: 24.3 % (ref 40.5–52.5)
HCT VFR BLD CALC: 24.9 % (ref 40.5–52.5)
HCT VFR BLD CALC: 25 % (ref 40.5–52.5)
HEMOGLOBIN: 8 G/DL (ref 13.5–17.5)
HEMOGLOBIN: 8.1 G/DL (ref 13.5–17.5)
HEMOGLOBIN: 8.4 G/DL (ref 13.5–17.5)
LYMPHOCYTES ABSOLUTE: 1 K/UL (ref 1–5.1)
LYMPHOCYTES RELATIVE PERCENT: 11.2 %
MAGNESIUM: 2.2 MG/DL (ref 1.8–2.4)
MCH RBC QN AUTO: 30.1 PG (ref 26–34)
MCHC RBC AUTO-ENTMCNC: 33.8 G/DL (ref 31–36)
MCV RBC AUTO: 89.2 FL (ref 80–100)
MONOCYTES ABSOLUTE: 1.1 K/UL (ref 0–1.3)
MONOCYTES RELATIVE PERCENT: 12 %
NEUTROPHILS ABSOLUTE: 6.5 K/UL (ref 1.7–7.7)
NEUTROPHILS RELATIVE PERCENT: 71.7 %
PDW BLD-RTO: 15.7 % (ref 12.4–15.4)
PHOSPHORUS: 4.9 MG/DL (ref 2.5–4.9)
PLATELET # BLD: 228 K/UL (ref 135–450)
PMV BLD AUTO: 8.6 FL (ref 5–10.5)
POTASSIUM REFLEX MAGNESIUM: 3.5 MMOL/L (ref 3.5–5.1)
RBC # BLD: 2.79 M/UL (ref 4.2–5.9)
SODIUM BLD-SCNC: 141 MMOL/L (ref 136–145)
WBC # BLD: 9.1 K/UL (ref 4–11)

## 2022-10-18 PROCEDURE — 83735 ASSAY OF MAGNESIUM: CPT

## 2022-10-18 PROCEDURE — 80048 BASIC METABOLIC PNL TOTAL CA: CPT

## 2022-10-18 PROCEDURE — 6370000000 HC RX 637 (ALT 250 FOR IP): Performed by: STUDENT IN AN ORGANIZED HEALTH CARE EDUCATION/TRAINING PROGRAM

## 2022-10-18 PROCEDURE — 93970 EXTREMITY STUDY: CPT

## 2022-10-18 PROCEDURE — 2060000000 HC ICU INTERMEDIATE R&B

## 2022-10-18 PROCEDURE — 85018 HEMOGLOBIN: CPT

## 2022-10-18 PROCEDURE — 6370000000 HC RX 637 (ALT 250 FOR IP)

## 2022-10-18 PROCEDURE — 6370000000 HC RX 637 (ALT 250 FOR IP): Performed by: INTERNAL MEDICINE

## 2022-10-18 PROCEDURE — 2500000003 HC RX 250 WO HCPCS: Performed by: STUDENT IN AN ORGANIZED HEALTH CARE EDUCATION/TRAINING PROGRAM

## 2022-10-18 PROCEDURE — 85025 COMPLETE CBC W/AUTO DIFF WBC: CPT

## 2022-10-18 PROCEDURE — 99233 SBSQ HOSP IP/OBS HIGH 50: CPT | Performed by: INTERNAL MEDICINE

## 2022-10-18 PROCEDURE — 6360000002 HC RX W HCPCS: Performed by: STUDENT IN AN ORGANIZED HEALTH CARE EDUCATION/TRAINING PROGRAM

## 2022-10-18 PROCEDURE — 2580000003 HC RX 258

## 2022-10-18 PROCEDURE — 84100 ASSAY OF PHOSPHORUS: CPT

## 2022-10-18 PROCEDURE — 85014 HEMATOCRIT: CPT

## 2022-10-18 PROCEDURE — 2580000003 HC RX 258: Performed by: STUDENT IN AN ORGANIZED HEALTH CARE EDUCATION/TRAINING PROGRAM

## 2022-10-18 PROCEDURE — 36415 COLL VENOUS BLD VENIPUNCTURE: CPT

## 2022-10-18 RX ORDER — CARVEDILOL 25 MG/1
25 TABLET ORAL 2 TIMES DAILY WITH MEALS
Status: DISCONTINUED | OUTPATIENT
Start: 2022-10-18 | End: 2022-10-21 | Stop reason: HOSPADM

## 2022-10-18 RX ORDER — NIFEDIPINE 60 MG/1
60 TABLET, EXTENDED RELEASE ORAL DAILY
Status: DISCONTINUED | OUTPATIENT
Start: 2022-10-18 | End: 2022-10-21 | Stop reason: HOSPADM

## 2022-10-18 RX ORDER — HYDRALAZINE HYDROCHLORIDE 50 MG/1
50 TABLET, FILM COATED ORAL EVERY 8 HOURS SCHEDULED
Status: DISCONTINUED | OUTPATIENT
Start: 2022-10-18 | End: 2022-10-20

## 2022-10-18 RX ORDER — NIFEDIPINE 60 MG/1
60 TABLET, EXTENDED RELEASE ORAL DAILY
Status: DISCONTINUED | OUTPATIENT
Start: 2022-10-18 | End: 2022-10-18

## 2022-10-18 RX ORDER — HYDRALAZINE HYDROCHLORIDE 50 MG/1
50 TABLET, FILM COATED ORAL EVERY 8 HOURS SCHEDULED
Status: DISCONTINUED | OUTPATIENT
Start: 2022-10-18 | End: 2022-10-18

## 2022-10-18 RX ORDER — ISOSORBIDE MONONITRATE 60 MG/1
60 TABLET, EXTENDED RELEASE ORAL DAILY
Status: DISCONTINUED | OUTPATIENT
Start: 2022-10-18 | End: 2022-10-21 | Stop reason: HOSPADM

## 2022-10-18 RX ADMIN — ISOSORBIDE MONONITRATE 60 MG: 60 TABLET, EXTENDED RELEASE ORAL at 16:41

## 2022-10-18 RX ADMIN — LABETALOL HYDROCHLORIDE 20 MG: 5 INJECTION, SOLUTION INTRAVENOUS at 05:09

## 2022-10-18 RX ADMIN — CALCITRIOL CAPSULES 0.25 MCG 0.25 MCG: 0.25 CAPSULE ORAL at 10:03

## 2022-10-18 RX ADMIN — IRON SUCROSE 100 MG: 20 INJECTION, SOLUTION INTRAVENOUS at 10:04

## 2022-10-18 RX ADMIN — CARVEDILOL 25 MG: 25 TABLET, FILM COATED ORAL at 16:41

## 2022-10-18 RX ADMIN — HYDRALAZINE HYDROCHLORIDE 50 MG: 50 TABLET, FILM COATED ORAL at 21:31

## 2022-10-18 RX ADMIN — PANTOPRAZOLE SODIUM 40 MG: 40 TABLET, DELAYED RELEASE ORAL at 16:41

## 2022-10-18 RX ADMIN — SODIUM CHLORIDE, PRESERVATIVE FREE 10 ML: 5 INJECTION INTRAVENOUS at 21:31

## 2022-10-18 RX ADMIN — HYDRALAZINE HYDROCHLORIDE 50 MG: 50 TABLET, FILM COATED ORAL at 16:41

## 2022-10-18 RX ADMIN — LOSARTAN POTASSIUM 100 MG: 100 TABLET, FILM COATED ORAL at 10:03

## 2022-10-18 RX ADMIN — SODIUM CHLORIDE, PRESERVATIVE FREE 10 ML: 5 INJECTION INTRAVENOUS at 10:06

## 2022-10-18 RX ADMIN — NIFEDIPINE 60 MG: 60 TABLET, FILM COATED, EXTENDED RELEASE ORAL at 10:03

## 2022-10-18 RX ADMIN — PANTOPRAZOLE SODIUM 40 MG: 40 TABLET, DELAYED RELEASE ORAL at 05:09

## 2022-10-18 RX ADMIN — LABETALOL HYDROCHLORIDE 20 MG: 5 INJECTION, SOLUTION INTRAVENOUS at 12:50

## 2022-10-18 NOTE — PLAN OF CARE
Problem: Discharge Planning  Goal: Discharge to home or other facility with appropriate resources  10/18/2022 1933 by Anshul Azar RN  Outcome: Progressing  Flowsheets (Taken 10/18/2022 1933)  Discharge to home or other facility with appropriate resources:   Identify barriers to discharge with patient and caregiver   Identify discharge learning needs (meds, wound care, etc)     Problem: Cardiovascular - Adult  Goal: Maintains optimal cardiac output and hemodynamic stability  10/18/2022 1933 by Anshul Azar RN  Outcome: Progressing  Flowsheets (Taken 10/18/2022 1933)  Maintains optimal cardiac output and hemodynamic stability:   Monitor blood pressure and heart rate   Monitor urine output and notify Licensed Independent Practitioner for values outside of normal range   Assess for signs of decreased cardiac output   Administer vasoactive medications as ordered     Problem: Safety - Adult  Goal: Free from fall injury  Outcome: Adequate for Discharge  Note: No falls noted thus far this shift, bed in lowest position, non-skid socks on, call light within reach, hourly checks, safety maintained, will continue to monitor. Pt educated on safety and being up ad reynaldo. Problem: Skin/Tissue Integrity  Goal: Absence of new skin breakdown  Description: 1. Monitor for areas of redness and/or skin breakdown  2. Assess vascular access sites hourly  3. Every 4-6 hours minimum:  Change oxygen saturation probe site  4. Every 4-6 hours:  If on nasal continuous positive airway pressure, respiratory therapy assess nares and determine need for appliance change or resting period.   Outcome: Adequate for Discharge     Problem: ABCDS Injury Assessment  Goal: Absence of physical injury  Outcome: Adequate for Discharge

## 2022-10-18 NOTE — PROGRESS NOTES
Dr. Brandie Kearney and cath lab charge nurse notified of continued oozing at Catheter site. Will change dressing and apply pressure. Report to oncoming RN.

## 2022-10-18 NOTE — PROGRESS NOTES
H/H Result 8.1/25.0, unchanged from previous result at 1930. Continues with slow oozing. Dr Roseanne Stafford to bedside to [facility].  Dressing reinforced and pressure dressing in place.

## 2022-10-18 NOTE — PROGRESS NOTES
Nephrology Consult Note        [facility]    [email or web address]       Phone: [phone]                                                  10/18/2022 8:35 AM     Patient: Christian Cuello [id number]  [id number]  Date of Admit: 10/14/2022 LOS: 3 days        Interval History and Plan:  Seen patient at bedside with his wife  Patient comfortable on room air  No complaints. Oriented x 3  K 3.5  Bicarbonate OK  Hb stable   No SOB on room air  BP remain elevated  TDC placed      No urgency for dialysis today. Avoid aggressive solute clearance. Plan for dialysis tomorrow. Continue Carvedilol, Losartan  Change amlodipine to Nifedipine. Monitor BP and will adjust medications further   Continue Calcitriol  Monitor Hb and transfuse as needed. Start IV iron. Will start Epogen once BP is better controlled. Avoid nephrotoxins  Monitor input, output and weight  Monitor labs and vitals closely  Renally dose all medications  Requested [facility] for dialysis placement. D/W patient and he agree with plan  Updated dialysis nurse. Thank you for allowing us to participate in this patient's care    In case of any question please call us at our 24 hour answering service [phone] or from 7 AM to 5 PM via [employer] or cell number    Kaitlynn Bush MD  [facility]  [employer] [address], [address]  Fax: [phone]  Office: [phone]       Assessment & Plan     Renal function:    Chronic Kidney Disease 5/ESRD  Cr 13    Started dialysis this admission. Electrolytes:  Lab Results   Component Value Date    CREATININE 9.9 (HH) 10/18/2022    BUN 59 (H) 10/18/2022     10/18/2022    K 3.5 10/18/2022     10/18/2022    CO2 24 10/18/2022            # CKD- MBD:   Secondary hyperparathyroidism due to renal failure  Monitor Phos level while here.    Lab Results   Component Value Date    .5 (H) 10/14/2022    CALCIUM 8.2 (L) 10/18/2022    PHOS 4.9 10/18/2022 Acid/Base status:  Mild metabolic acidosis > manage with dialysis. Volume status/BP:  BP elevated. Volume overloaded with chronic edema but comfortable on room air. No urgency for aggressive UF      Intake/Output Summary (Last 24 hours) at 10/18/2022 0835  Last data filed at 10/17/2022 1744  Gross per 24 hour   Intake 360 ml   Output 0 ml   Net 360 ml           Hematology:   CKD related anemia  Goal Hgb 10-11    Lab Results   Component Value Date    IRON 18 (L) 10/15/2022    TIBC 242 (L) 10/15/2022    FERRITIN 130.2 10/15/2022     Lab Results   Component Value Date    WBC 9.1 10/18/2022    HGB 8.4 (L) 10/18/2022    HCT 24.9 (L) 10/18/2022    MCV 89.2 10/18/2022     10/18/2022             Reason for Consult and Chief Complaint     Reason for consult: ESRD    Chief complaint:   Chief Complaint   Patient presents with    Leg Swelling     C/o on going leg swelling for a \"few weeks\" saw PCP and sent to get blood work today    Anemia     PCP called tonight and sent to ED d/t blood count low, patient states told \"5.0\" patient states sob, and dark stools for few days        History of Present Illness   Jerald Kat is a 55 y.o. with PMH significant for CKD 5 was admitted with leg swelling, exertional SOB and low Hb. No chest pain. No SOB at rest. Patient also gaining weight. Patient reported some black stools. No focal weakness. Review of Systems   Positive in bold or unable to assess     GEN: Fever, chills, night sweats. HEENT: Changes in vision, sore throat, rhinorrhea   CVS: Chest pain, palpitations,swelling or edema in legs  Pulmonary: Cough, hemoptysis, SOB   GI: Nausea, vomiting, diarrhea, constipation, abdominal pain  : Bladder incontinence, dysuria,hematuria. MSK: Muscle or joint or bone pains  Skin: Rashes, ulcers, skin thickness  CNS: Headache, dizziness, confusion, focal weakness, seizure. Psych: Anxiety, agitation, depression.   Reviewed all 12 systems, negative except as above. Past Medical History     Past Medical History:   Diagnosis Date    Anxiety 3/28/2017    CKD (chronic kidney disease) 6/16/2017    Hyperlipidemia LDL goal <100 6/16/2017    Hypertension     Obesity (BMI 30.0-34.9) 6/16/2017         Past Surgical History     Past Surgical History:   Procedure Laterality Date    IR TUNNELED CATHETER PLACEMENT GREATER THAN 5 YEARS  10/17/2022    IR TUNNELED CATHETER PLACEMENT GREATER THAN 5 YEARS 10/17/2022 [facility]         Family History     History reviewed. No pertinent family history. Social History     Social History     Tobacco Use    Smoking status: Some Days     Years: 4.00     Types: Cigarettes    Smokeless tobacco: Never    Tobacco comments:      2 a week   Substance Use Topics    Alcohol use: No          Past medical, family, and social histories were reviewed as previously documented. Updates were made as necessary. Inpatient Medications and Allergies       Scheduled Meds:   carvedilol  25 mg Oral BID WC    hydrALAZINE  50 mg Oral 3 times per day    losartan  100 mg Oral Daily    heparin (porcine)  5,000 Units SubCUTAneous 3 times per day    sodium chloride flush  5-40 mL IntraVENous 2 times per day    pantoprazole  40 mg Oral BID AC    calcitRIOL  0.25 mcg Oral Daily    amLODIPine  10 mg Oral Daily       Allergies: No Known Allergies      Vital Signs     Vitals:    10/18/22 0814   BP: (!) 170/87   Pulse: 85   Resp: 18   Temp: 98.3 °F (36.8 °C)   SpO2: 98%         Intake/Output Summary (Last 24 hours) at 10/18/2022 0835  Last data filed at 10/17/2022 1744  Gross per 24 hour   Intake 360 ml   Output 0 ml   Net 360 ml           Physical Exam     General appearance: NAD  Head: Normocephalic, without obvious abnormality, atraumatic   Mouth: Moist mucous membrane  Neck: Supple. Lungs: Good air entry bilaterally. No respiratory distress on RA.    Heart: S1, S2.  Abdomen: soft, non-tender non-distended  Extremities:Chronic appearing significant leg edema  Skin: No concerning rashes noted  Psych: good eye contact, normal affect  Neuro: AAO x 3, non focal.       Laboratory Data     Please see above     Diagnostic Studies   Pertinent images reviewed

## 2022-10-18 NOTE — PROGRESS NOTES
Tunneled HD catheter right chest wall continues to slowly ooze. Dressing was changed, pressure held x 20 minutes and pressure dressing applied. Dr. Roseanne Stafford made aware of oozing and spoke with Dr. Geoffrey walls on call for IR. Will continue to apply pressure and monitor oozing. Appears to be slowing down. H/H ordered stat per Dr. Roseanne Stafford.

## 2022-10-18 NOTE — CARE COORDINATION
CM met with pt and wife at bedside. Pt has no insurance and does not qualify for Medicaid. Pt may qualify for Medicare due to dx of ESRD.  CM to discuss with patient in am.    Stefanie Luna RN, BSN, [address]  Case Management Department  [phone]

## 2022-10-18 NOTE — PROGRESS NOTES
4 Eyes Admission Assessment     I agree as the admission nurse that 2 RN's have performed a thorough Head to Toe Skin Assessment on the patient. ALL assessment sites listed below have been assessed on admission. Areas assessed by both nurses:   [x]   Head, Face, and Ears   [x]   Shoulders, Back, and Chest  [x]   Arms, Elbows, and Hands   [x]   Coccyx, Sacrum, and Ischium  [x]   Legs, Feet, and Heels        Does the Patient have Skin Breakdown?   No         Benny Prevention initiated:  Yes   Wound Care Orders initiated:  No      [facility] nurse consulted for Pressure Injury (Stage 3,4, Unstageable, DTI, NWPT, and Complex wounds) or Benny score 18 or lower:  No      Nurse 1 eSignature: Electronically signed by Maria T Herrera RN on 10/18/22 at 10:52 AM EDT    **SHARE this note so that the co-signing nurse is able to place an eSignature**    Nurse 2 eSignature: Electronically signed by Rashi Weinstein RN on 10/18/22 at 10:54 AM EDT

## 2022-10-18 NOTE — PROGRESS NOTES
BP elevated to 197/123 and HR at 88 upon returning from Vascular. PRN labetalol given. Will recheck in 1 hour.

## 2022-10-18 NOTE — PROGRESS NOTES
/99. PT asymptomatic. Labetalol parameters for SBP> 190. PT continues bleeding at [facility] site. ON call hosp notified to advise.  Ok to give labetalol now per md    Electronically signed by Munir Grider RN on 69/03/3882 at 4:56 AM

## 2022-10-18 NOTE — PLAN OF CARE
Problem: Discharge Planning  Goal: Discharge to home or other facility with appropriate resources  Outcome: Progressing   No needs at this time.   Problem: Cardiovascular - Adult  Goal: Maintains optimal cardiac output and hemodynamic stability  Outcome: Progressing

## 2022-10-18 NOTE — PROGRESS NOTES
Hospitalist Progress Note      PCP: Shannen Stein MD    Date of Admission: 10/14/2022    Chief Complaint:       Subjective:       Medications:  Reviewed    Infusion Medications    sodium chloride      sodium chloride      sodium chloride       Scheduled Medications    losartan  100 mg Oral Daily    heparin (porcine)  5,000 Units SubCUTAneous 3 times per day    sodium chloride flush  5-40 mL IntraVENous 2 times per day    pantoprazole  40 mg Oral BID AC    calcitRIOL  0.25 mcg Oral Daily    carvedilol  12.5 mg Oral BID WC    amLODIPine  10 mg Oral Daily     PRN Meds: labetalol, sodium chloride, sodium chloride flush, sodium chloride, ondansetron **OR** ondansetron, polyethylene glycol, acetaminophen **OR** acetaminophen, hydrOXYzine HCl, sodium chloride      Intake/Output Summary (Last 24 hours) at 10/18/2022 0810  Last data filed at 10/17/2022 1744  Gross per 24 hour   Intake 360 ml   Output 0 ml   Net 360 ml       Physical Exam Performed:    BP (!) 171/101   Pulse 84   Temp 97.2 °F (36.2 °C) (Oral)   Resp 19   Ht 6' 2\" (1.88 m)   Wt 257 lb 0.9 oz (116.6 kg)   SpO2 98%   BMI 33.00 kg/m²     General appearance: No apparent distress, appears stated age and cooperative. HEENT: Pupils equal, round, and reactive to light. Conjunctivae/corneas clear. Neck: Supple, with full range of motion. No jugular venous distention. Trachea midline. Respiratory:  Normal respiratory effort. Clear to auscultation, bilaterally without Rales/Wheezes/Rhonchi. Cardiovascular: Regular rate and rhythm with normal S1/S2 without murmurs, rubs or gallops. Abdomen: Soft, non-tender, non-distended with normal bowel sounds. Musculoskeletal: No clubbing, cyanosis or edema bilaterally. Full range of motion without deformity. Skin: Skin color, texture, turgor normal.  No rashes or lesions. Neurologic:  Neurovascularly intact without any focal sensory/motor deficits.  Cranial nerves: II-XII intact, grossly non-focal.  Psychiatric: Alert and oriented, thought content appropriate, normal insight  Capillary Refill: Brisk,< 3 seconds   Peripheral Pulses: +2 palpable, equal bilaterally       Labs:   Recent Labs     10/16/22  0607 10/16/22  1904 10/17/22  0625 10/17/22  1931 10/18/22  0026 10/18/22  0501   WBC 9.2  --  9.1  --   --  9.1   HGB 8.2*   < > 8.1* 8.1* 8.1* 8.4*   HCT 23.7*   < > 24.0* 23.9* 25.0* 24.9*     --  232  --   --  228    < > = values in this interval not displayed. Recent Labs     10/16/22  0607 10/17/22  0625 10/18/22  0501    139 141   K 2.9* 3.2* 3.5    100 102   CO2 22 22 24   BUN 87* 84* 59*   CREATININE 12.3* 12.2* 9.9*   CALCIUM 8.0* 8.6 8.2*   PHOS 6.9* 7.8* 4.9     No results for input(s): AST, ALT, BILIDIR, BILITOT, ALKPHOS in the last 72 hours. No results for input(s): INR in the last 72 hours. No results for input(s): Othelia Nutley in the last 72 hours. Urinalysis:      Lab Results   Component Value Date/Time    NITRU Negative 10/15/2022 09:27 PM    WBCUA 10-20 10/15/2022 09:27 PM    RBCUA 0-2 10/15/2022 09:27 PM    BLOODU SMALL 10/15/2022 09:27 PM    SPECGRAV 1.020 10/15/2022 09:27 PM    GLUCOSEU Negative 10/15/2022 09:27 PM       Radiology:  IR TUNNELED CVC PLACE WO SQ PORT/PUMP > 5 YEARS   Final Result   1. Satisfactory ultrasound and fluoroscopic-guided placement and positioning of tunneled  right internal jugular dialysis catheter   2. Catheter tip in satisfactory positioning of right atrium and is ready for use. 3. Conscious sedation without complication         XR CHEST PORTABLE   Final Result   1. Cardiomegaly. XR CHEST (2 VW)   Final Result      Gross cardiac enlargement. .      No acute traumatic consolidation. Assessment/Plan: This is 25-year-old male with history of HTN, HLD, CKD V (not on dialysis), who presented with leg swelling and recent abnormal labs.  Patient states that he began having bilateral leg swelling about 2 weeks. He states that he has also been having a dyspnea on exertion but denies any chest pain. He also states that he has recently gained about 20 to 30 pounds. patient also states that he was called due to very low hemoglobin on recent labs. He states that he has been having dark stools. Patient denies any lightheadedness/ dizziness, nausea/vomiting, abdominal pain, diarrhea/constipation, dysuria or hematuria. Patient states that he is compliant with his home BP medications. Patient has CKD V and has had multiple referrals to nephrologist but did not go to them. On arrival patient was hypertensive with a /104. Labs showed hemoglobin of 5.7, creatinine of 13.8, and BNP >70,000. CXR showed gross cardiac enlargement with no acute traumatic consolidation. Nephrology was contacted in the ED, recommended no acute indication for emergent dialysis and he was given 80mg IV lasix. He was also given 1g IV ceftriaxone for suspected upper GI bleed.   Active Hospital Problems    Diagnosis Date Noted    Anemia [D64.9] 10/16/2022     Priority: Medium    Leg swelling [M79.89] 10/16/2022     Priority: Medium    Acute renal failure (ARF) ([facility] [address] [N17.9] 10/15/2022     Priority: Medium    ESRD (end stage renal disease) ([facility] [address] [N18.6] 10/15/2022     Priority: Medium    CKD (chronic kidney disease) [N18.9] 06/16/2017    Essential hypertension [I10] 12/14/2016     New presentation of ESRD  - Nephrology consulted  - s/p  tunneled RIJ HD Cath 10/17/22  - HD run #1 10/16; #2 on 10/17.  - Trend renal function labs  - Hold further diuretics     Anemia likely 2/2 CKD vs Upper GI bleed  - adm HgB 5.7 on 10/15/22.  -S/p 3 units PRBCs w/ Hgb in the 8.  -Trend Hgb q12h today; can probably space to qd tomorrow if stable  -Protonix 40 mg PO bid  -GI consulted, no acute intervention indicated currently; will re-enngage if clinical changes  -Resume diet     Acute HFrEF  New CM  Abnormal EKG  Patient presented with bilateral leg swelling, weight gain, and shortness of breath on exertion. On presentation, BNP >70,000. CXR showed gross cardiac enlargement with no acute traumatic consolidation.  -Echocardiogram 10/15/22 showed normal LV size, mild concentric LVH, mild-mod decreased LVEF 4045%. Global hypokinesis. Grade III diastolic dysfunction with elevated LV filling pressures. LA and RA dilated. Mild valvular disease.  -Cardiology consulted. -Strict I's and O  -Daily weights     Severe Hypertension  Patient is on Lasix 40 mg, hydralazine 100 mg twice daily, amlodipine 5 mg, and metoprolol 100 mg at home  - Current Carvedilol 12.5 mg bid, Losartan 100 mg qd, amlodipine 10 mg.  - Continue         Diet: ADULT DIET; Regular; Low Sodium (2 gm); Low Potassium (Less than 3000 mg/day); Low Phosphorus (Less than 1000 mg)  Code Status: Full Code  PT/OT Eval Status: N/A    DVT Prophylaxis: SCD, acute anemia  Diet: ADULT DIET; Regular; Low Sodium (2 gm); Low Potassium (Less than 3000 mg/day);  Low Phosphorus (Less than 1000 mg)  Code Status: Full Code    PT/OT Eval Status:     Dispo - TBD    Abraham Up MD

## 2022-10-18 NOTE — PROGRESS NOTES
The [address] [place] [address]  Cardiology Daily Progress Note  10/18/2022,10:09 AM      Gissel Benson MD ,Regulo Rowley MD  Primary cardiologist:    [address] [place] [address] Date:  10/14/2022  Hospital Day: Hospital Day: 5  Subjective:  Mr. Isaac Medeiros is awake and alert today having no distress. Breathing is gotten better and his peripheral edema has significantly abated. The rhythm remained stable. The echocardiogram did show significant LV dysfunction with an EF of 40 to 45%. I believe most of his current issues are related to hypertension and has been not optimally treated. I do think he needs to have a heart cath. I think at this point in time tomorrow will be a great day for heart cath. Spoke to the patient and he is stable and agreeable. Creatinine today is 9.9. Still significantly anemic with an H&H of 8.4 and 24.9. Receiving transfusion and of blood and infusion of iron. Acute on chronic renal failure now on hemodialysis with creatinine currently down to 9.9  Severe hypertension  Cardiomyopathy ejection fraction 40 to 45%  Severe anemia currently being treated  Lipid profile is pending    Objective:   BP (!) 170/87   Pulse 85   Temp 98.3 °F (36.8 °C) (Oral)   Resp 18   Ht 6' 2\" (1.88 m)   Wt 254 lb 10.1 oz (115.5 kg)   SpO2 98%   BMI 32.69 kg/m²     Intake/Output Summary (Last 24 hours) at 10/18/2022 1009  Last data filed at 10/18/2022 0534  Gross per 24 hour   Intake 720 ml   Output 300 ml   Net 420 ml       TELEMETRY: Sinus rhythm 84/min. Late transition. Diffuse nonspecific ST and T wave changes. Physical Examination:    Vitals:    10/18/22 0439 10/18/22 0514 10/18/22 0534 10/18/22 0814   BP: (!) 186/99 (!) 161/99 (!) 171/101 (!) 170/87   Pulse: 86 84  85   Resp: 19   18   Temp: 97.2 °F (36.2 °C)   98.3 °F (36.8 °C)   TempSrc: Oral   Oral   SpO2: 98%   98%   Weight: 254 lb 10.1 oz (115.5 kg)      Height:            Constitutional and General Appearance:  Healthy.  And alert .  HEENT: eyes and ears intact. No nasal masses  THYROID: not enlarged  LUNGS:  Clear to auscultation and percussion  HEART and VASCULAR:  The apical impulses not displaced  Heart tones are crisp and normal  Cervical veins are not engorged  The carotid upstroke is normal in amplitude and contour without delay or bruit  Peripheral pulses are symmetrical and full  There is no clubbing, cyanosis of the extremities. No peripheral edema  Femoral Arteries: 2+ and equal  Pedal Pulses: 2+ and equal   ABDOMEN[de-identified]  No masses or tenderness  Liver/Spleen: No Abnormalities Noted  NEUROLOGICAL:  . Moves all extremities to command. Cranial nerves 2-12 are in tact. PSYCHIATRIC: alert and lucid and oriented and appropriate  SKIN: No lesions or rashes  LYMPH NODES: none enlarged      Medications:    carvedilol  25 mg Oral BID WC    NIFEdipine  60 mg Oral Daily    iron sucrose  100 mg IntraVENous Q24H    losartan  100 mg Oral Daily    heparin (porcine)  5,000 Units SubCUTAneous 3 times per day    sodium chloride flush  5-40 mL IntraVENous 2 times per day    pantoprazole  40 mg Oral BID AC    calcitRIOL  0.25 mcg Oral Daily      sodium chloride      sodium chloride      sodium chloride       labetalol, sodium chloride, sodium chloride flush, sodium chloride, ondansetron **OR** ondansetron, polyethylene glycol, acetaminophen **OR** acetaminophen, hydrOXYzine HCl, sodium chloride    Lab Data:  CBC:   Recent Labs     10/16/22  0607 10/16/22  1904 10/17/22  0625 10/17/22  1931 10/18/22  0026 10/18/22  0501   WBC 9.2  --  9.1  --   --  9.1   HGB 8.2*   < > 8.1* 8.1* 8.1* 8.4*   HCT 23.7*   < > 24.0* 23.9* 25.0* 24.9*   MCV 88.7  --  89.4  --   --  89.2     --  232  --   --  228    < > = values in this interval not displayed.      BMP:   Recent Labs     10/16/22  0607 10/17/22  0625 10/18/22  0501    139 141   K 2.9* 3.2* 3.5    100 102   CO2 22 22 24   PHOS 6.9* 7.8* 4.9   BUN 87* 84* 59*   CREATININE 12.3* 12.2* 9.9*     Troponin:Troponin:   Lab Results   Component Value Date/Time    TROPONINI 0.06 10/15/2022 12:10 AM     LIVER PROFILE: No results for input(s): AST, ALT, LIPASE, BILIDIR, BILITOT, ALKPHOS in the last 72 hours. Invalid input(s): AMYLASE,  ALB  PT/INR: No results for input(s): PROTIME, INR in the last 72 hours. APTT: No results for input(s): APTT in the last 72 hours. BNP:  No results for input(s): BNP in the last 72 hours. IMAGING: as noted   Summary 10/15/2022   Normal left ventricular size. Mild concentric left ventricular hypertrophy. Mild-moderately decreased left ventricular systolic function with an   estimated ejection fraction of 40%-45%. Global hypokinesis. Grade III diastolic dysfunction with elevated LV filling pressures. The right ventricle is normal in size. Tapse: 1.63 cm. RV s: 12.6 cm/s   The left atrium is dilated. The right atrium is dilated. The aortic root is dilated measuring 4.0 cm. Mild mitral regurgitation. Trivial aortic regurgitation. Mild tricuspid regurgitation. Estimated pulmonary artery systolic pressure is at 18 mmHg assuming a right   atrial pressure of 8 mmHg. Assessment:  Patient Active Problem List    Diagnosis Date Noted    Anemia 10/16/2022    Leg swelling 10/16/2022    Acute renal failure (ARF) ([facility] [address] 10/15/2022    ESRD (end stage renal disease) ([facility] [address] 10/15/2022    Poor compliance 12/07/2020    Elevated glucose 12/07/2020    Hyperlipidemia LDL goal <100 06/16/2017    CKD (chronic kidney disease) 06/16/2017    Obesity (BMI 30.0-34.9) 06/16/2017    Anxiety 03/28/2017    Essential hypertension 12/14/2016       Plan:   Continue current medications. Core Measures:  Discharge instructions:   LVEF documented:   ACEI for LV dysfunction:   Cardiomyopathy. Acute on chronic renal failure. Severe hypertension.       Planning cardiac cath tomorrow at 1 PM.  Thanks for allowing me  the opportunity to participate in the evaluation and care of your patient.  If there are questions please call me [phone]    This note was likely completed using voice recognition technology and may contain unintended grammatical, phraseology and/or punctuation  errors      Alida Fitch MD ,[facility]  10/18/2022 10:09 AM

## 2022-10-19 ENCOUNTER — APPOINTMENT (OUTPATIENT)
Dept: CARDIAC CATH/INVASIVE PROCEDURES | Age: 47
DRG: 286 | End: 2022-10-19

## 2022-10-19 LAB
ANION GAP SERPL CALCULATED.3IONS-SCNC: 13 MMOL/L (ref 3–16)
BASOPHILS ABSOLUTE: 0.1 K/UL (ref 0–0.2)
BASOPHILS RELATIVE PERCENT: 0.7 %
BUN BLDV-MCNC: 60 MG/DL (ref 7–20)
C-REACTIVE PROTEIN: 58.2 MG/L (ref 0–5.1)
CALCIUM SERPL-MCNC: 8.2 MG/DL (ref 8.3–10.6)
CHLORIDE BLD-SCNC: 104 MMOL/L (ref 99–110)
CO2: 24 MMOL/L (ref 21–32)
CREAT SERPL-MCNC: 10.5 MG/DL (ref 0.9–1.3)
EOSINOPHILS ABSOLUTE: 0.3 K/UL (ref 0–0.6)
EOSINOPHILS RELATIVE PERCENT: 2.2 %
GFR SERPL CREATININE-BSD FRML MDRD: 6 ML/MIN/{1.73_M2}
GLUCOSE BLD-MCNC: 121 MG/DL (ref 70–99)
HCT VFR BLD CALC: 23.2 % (ref 40.5–52.5)
HEMOGLOBIN: 7.8 G/DL (ref 13.5–17.5)
HEPATITIS B CORE TOTAL ANTIBODY: NEGATIVE
LACTIC ACID: 0.8 MMOL/L (ref 0.4–2)
LEFT VENTRICULAR EJECTION FRACTION HIGH VALUE: 45 % (ref 40–45)
LEFT VENTRICULAR EJECTION FRACTION MODE: NORMAL
LV EF: 40 % (ref 40–45)
LYMPHOCYTES ABSOLUTE: 1 K/UL (ref 1–5.1)
LYMPHOCYTES RELATIVE PERCENT: 7.3 %
MAGNESIUM: 2.3 MG/DL (ref 1.8–2.4)
MCH RBC QN AUTO: 29.8 PG (ref 26–34)
MCHC RBC AUTO-ENTMCNC: 33.7 G/DL (ref 31–36)
MCV RBC AUTO: 88.7 FL (ref 80–100)
MONOCYTES ABSOLUTE: 1.2 K/UL (ref 0–1.3)
MONOCYTES RELATIVE PERCENT: 9 %
NEUTROPHILS ABSOLUTE: 10.9 K/UL (ref 1.7–7.7)
NEUTROPHILS RELATIVE PERCENT: 80.8 %
PDW BLD-RTO: 15.7 % (ref 12.4–15.4)
PLATELET # BLD: 206 K/UL (ref 135–450)
PMV BLD AUTO: 8.7 FL (ref 5–10.5)
POTASSIUM REFLEX MAGNESIUM: 3.4 MMOL/L (ref 3.5–5.1)
PROCALCITONIN: 0.4 NG/ML (ref 0–0.15)
RBC # BLD: 2.61 M/UL (ref 4.2–5.9)
SODIUM BLD-SCNC: 141 MMOL/L (ref 136–145)
WBC # BLD: 13.5 K/UL (ref 4–11)

## 2022-10-19 PROCEDURE — 6360000002 HC RX W HCPCS: Performed by: STUDENT IN AN ORGANIZED HEALTH CARE EDUCATION/TRAINING PROGRAM

## 2022-10-19 PROCEDURE — 83735 ASSAY OF MAGNESIUM: CPT

## 2022-10-19 PROCEDURE — 6360000002 HC RX W HCPCS

## 2022-10-19 PROCEDURE — 2500000003 HC RX 250 WO HCPCS: Performed by: STUDENT IN AN ORGANIZED HEALTH CARE EDUCATION/TRAINING PROGRAM

## 2022-10-19 PROCEDURE — 2580000003 HC RX 258: Performed by: INTERNAL MEDICINE

## 2022-10-19 PROCEDURE — 93458 L HRT ARTERY/VENTRICLE ANGIO: CPT

## 2022-10-19 PROCEDURE — 6370000000 HC RX 637 (ALT 250 FOR IP)

## 2022-10-19 PROCEDURE — 94761 N-INVAS EAR/PLS OXIMETRY MLT: CPT

## 2022-10-19 PROCEDURE — 6370000000 HC RX 637 (ALT 250 FOR IP): Performed by: STUDENT IN AN ORGANIZED HEALTH CARE EDUCATION/TRAINING PROGRAM

## 2022-10-19 PROCEDURE — 83605 ASSAY OF LACTIC ACID: CPT

## 2022-10-19 PROCEDURE — B2151ZZ FLUOROSCOPY OF LEFT HEART USING LOW OSMOLAR CONTRAST: ICD-10-PCS | Performed by: INTERNAL MEDICINE

## 2022-10-19 PROCEDURE — C1769 GUIDE WIRE: HCPCS

## 2022-10-19 PROCEDURE — 87040 BLOOD CULTURE FOR BACTERIA: CPT

## 2022-10-19 PROCEDURE — 2500000003 HC RX 250 WO HCPCS

## 2022-10-19 PROCEDURE — 2060000000 HC ICU INTERMEDIATE R&B

## 2022-10-19 PROCEDURE — 90935 HEMODIALYSIS ONE EVALUATION: CPT

## 2022-10-19 PROCEDURE — 36415 COLL VENOUS BLD VENIPUNCTURE: CPT

## 2022-10-19 PROCEDURE — 86140 C-REACTIVE PROTEIN: CPT

## 2022-10-19 PROCEDURE — 80048 BASIC METABOLIC PNL TOTAL CA: CPT

## 2022-10-19 PROCEDURE — 2580000003 HC RX 258

## 2022-10-19 PROCEDURE — C1894 INTRO/SHEATH, NON-LASER: HCPCS

## 2022-10-19 PROCEDURE — 84145 PROCALCITONIN (PCT): CPT

## 2022-10-19 PROCEDURE — 6370000000 HC RX 637 (ALT 250 FOR IP): Performed by: INTERNAL MEDICINE

## 2022-10-19 PROCEDURE — 2709999900 HC NON-CHARGEABLE SUPPLY

## 2022-10-19 PROCEDURE — 85025 COMPLETE CBC W/AUTO DIFF WBC: CPT

## 2022-10-19 PROCEDURE — B2111ZZ FLUOROSCOPY OF MULTIPLE CORONARY ARTERIES USING LOW OSMOLAR CONTRAST: ICD-10-PCS | Performed by: INTERNAL MEDICINE

## 2022-10-19 PROCEDURE — 99153 MOD SED SAME PHYS/QHP EA: CPT

## 2022-10-19 PROCEDURE — 6360000004 HC RX CONTRAST MEDICATION: Performed by: INTERNAL MEDICINE

## 2022-10-19 PROCEDURE — 4A023N7 MEASUREMENT OF CARDIAC SAMPLING AND PRESSURE, LEFT HEART, PERCUTANEOUS APPROACH: ICD-10-PCS | Performed by: INTERNAL MEDICINE

## 2022-10-19 PROCEDURE — 99152 MOD SED SAME PHYS/QHP 5/>YRS: CPT

## 2022-10-19 RX ORDER — SODIUM CHLORIDE 9 MG/ML
INJECTION, SOLUTION INTRAVENOUS PRN
Status: DISCONTINUED | OUTPATIENT
Start: 2022-10-19 | End: 2022-10-21 | Stop reason: HOSPADM

## 2022-10-19 RX ORDER — POTASSIUM CHLORIDE 20 MEQ/1
40 TABLET, EXTENDED RELEASE ORAL ONCE
Status: COMPLETED | OUTPATIENT
Start: 2022-10-19 | End: 2022-10-19

## 2022-10-19 RX ORDER — ACETAMINOPHEN 325 MG/1
650 TABLET ORAL EVERY 4 HOURS PRN
Status: DISCONTINUED | OUTPATIENT
Start: 2022-10-19 | End: 2022-10-21 | Stop reason: HOSPADM

## 2022-10-19 RX ORDER — HEPARIN SODIUM 1000 [USP'U]/ML
3200 INJECTION, SOLUTION INTRAVENOUS; SUBCUTANEOUS PRN
Status: DISCONTINUED | OUTPATIENT
Start: 2022-10-19 | End: 2022-10-21 | Stop reason: HOSPADM

## 2022-10-19 RX ORDER — SODIUM CHLORIDE 0.9 % (FLUSH) 0.9 %
5-40 SYRINGE (ML) INJECTION EVERY 12 HOURS SCHEDULED
Status: DISCONTINUED | OUTPATIENT
Start: 2022-10-19 | End: 2022-10-21 | Stop reason: HOSPADM

## 2022-10-19 RX ORDER — SODIUM CHLORIDE 0.9 % (FLUSH) 0.9 %
5-40 SYRINGE (ML) INJECTION PRN
Status: DISCONTINUED | OUTPATIENT
Start: 2022-10-19 | End: 2022-10-21 | Stop reason: HOSPADM

## 2022-10-19 RX ORDER — SODIUM CHLORIDE 9 MG/ML
INJECTION, SOLUTION INTRAVENOUS CONTINUOUS
Status: ACTIVE | OUTPATIENT
Start: 2022-10-19 | End: 2022-10-19

## 2022-10-19 RX ADMIN — HYDRALAZINE HYDROCHLORIDE 50 MG: 50 TABLET, FILM COATED ORAL at 06:23

## 2022-10-19 RX ADMIN — PANTOPRAZOLE SODIUM 40 MG: 40 TABLET, DELAYED RELEASE ORAL at 06:23

## 2022-10-19 RX ADMIN — CARVEDILOL 25 MG: 25 TABLET, FILM COATED ORAL at 18:30

## 2022-10-19 RX ADMIN — LABETALOL HYDROCHLORIDE 20 MG: 5 INJECTION, SOLUTION INTRAVENOUS at 04:15

## 2022-10-19 RX ADMIN — ISOSORBIDE MONONITRATE 60 MG: 60 TABLET, EXTENDED RELEASE ORAL at 08:45

## 2022-10-19 RX ADMIN — LOSARTAN POTASSIUM 100 MG: 100 TABLET, FILM COATED ORAL at 08:45

## 2022-10-19 RX ADMIN — HYDRALAZINE HYDROCHLORIDE 50 MG: 50 TABLET, FILM COATED ORAL at 14:55

## 2022-10-19 RX ADMIN — POTASSIUM CHLORIDE 40 MEQ: 1500 TABLET, EXTENDED RELEASE ORAL at 08:45

## 2022-10-19 RX ADMIN — HYDRALAZINE HYDROCHLORIDE 50 MG: 50 TABLET, FILM COATED ORAL at 21:35

## 2022-10-19 RX ADMIN — HEPARIN SODIUM 5000 UNITS: 5000 INJECTION INTRAVENOUS; SUBCUTANEOUS at 14:55

## 2022-10-19 RX ADMIN — CARVEDILOL 25 MG: 25 TABLET, FILM COATED ORAL at 08:45

## 2022-10-19 RX ADMIN — IOHEXOL 130 ML: 350 INJECTION, SOLUTION INTRAVENOUS at 13:47

## 2022-10-19 RX ADMIN — HEPARIN SODIUM 5000 UNITS: 5000 INJECTION INTRAVENOUS; SUBCUTANEOUS at 21:35

## 2022-10-19 RX ADMIN — CALCITRIOL CAPSULES 0.25 MCG 0.25 MCG: 0.25 CAPSULE ORAL at 08:45

## 2022-10-19 RX ADMIN — PANTOPRAZOLE SODIUM 40 MG: 40 TABLET, DELAYED RELEASE ORAL at 18:12

## 2022-10-19 RX ADMIN — SODIUM CHLORIDE, PRESERVATIVE FREE 10 ML: 5 INJECTION INTRAVENOUS at 21:36

## 2022-10-19 RX ADMIN — NIFEDIPINE 60 MG: 60 TABLET, FILM COATED, EXTENDED RELEASE ORAL at 08:46

## 2022-10-19 RX ADMIN — SODIUM CHLORIDE, PRESERVATIVE FREE 10 ML: 5 INJECTION INTRAVENOUS at 09:00

## 2022-10-19 NOTE — PROGRESS NOTES
Interventional cardiology  Post-cath note. Patient did undergo angiogram.  LV function is abnormal.  The EDP is 22. Coronary arteries are normal with right dominance. From a cardiac perspective he does have a myopathy. He is on hemodialysis for his renal failure. We will follow acutely.   Andrew Mehta MD, [facility] [place]

## 2022-10-19 NOTE — ANESTHESIA PRE-OP
Brief Pre-Op Note/Sedation Assessment      Josh Haver  1975  [id number]  2:06 PM    Planned Procedure: Cardiac Catheterization Procedure  Post Procedure Plan: Return to same level of care  Consent: I have discussed with the patient and/or the patient representative the indication, alternatives, and the possible risks and/or complications of the planned procedure and the anesthesia methods. The patient and/or patient representative appear to understand and agree to proceed. Chief Complaint:   Dyspnea on Exertion  Dyspnea      Indications for Cath Procedure:  Presentation:  Cardiomyopathy  2. Anginal Classification within 2 weeks:  No symptoms  3. Angina Symptoms Assessment:  Asymptomatic  4. Heart Failure Class within last 2 weeks:  No symptoms  5. Cardiovascular Instability:  No    Prior Ischemic Workup/Eval:  Pre-Procedural Medications: Yes: Aspirin, Beta Blockers, and Long Acting Nitrates (Any)  2. Stress Test Completed? No    Does Patient need surgery?   Cath Valve Surgery:  No    Pre-Procedure Medical History:  Vital Signs:  BP (!) 180/104   Pulse 88   Temp 98.7 °F (37.1 °C) (Oral)   Resp 20   Ht 6' 2\" (1.88 m)   Wt 250 lb 3.6 oz (113.5 kg)   SpO2 99%   BMI 32.13 kg/m²     Allergies:  No Known Allergies  Medications:    Current Facility-Administered Medications   Medication Dose Route Frequency Provider Last Rate Last Admin    heparin (porcine) injection 3,200 Units  3,200 Units IntraCATHeter PRN Alesia Acosta MD        carvedilol (COREG) tablet 25 mg  25 mg Oral BID WC Iron Tesfaye MD   25 mg at 10/19/22 0845    NIFEdipine (PROCARDIA XL) extended release tablet 60 mg  60 mg Oral Daily Nathan Deluna MD   60 mg at 10/19/22 0846    iron sucrose (VENOFER) 100 mg in sodium chloride 0.9 % 100 mL IVPB  100 mg IntraVENous Q24H Nathan Deluna MD   Stopped at 10/18/22 1139    hydrALAZINE (APRESOLINE) tablet 50 mg  50 mg Oral 3 times per day Iron Tesfaye MD 50 mg at 10/19/22 3834    isosorbide mononitrate (IMDUR) extended release tablet 60 mg  60 mg Oral Daily Arianna Piper MD   60 mg at 10/19/22 0845    losartan (COZAAR) tablet 100 mg  100 mg Oral Daily Scooby Bartholomew MD   100 mg at 10/19/22 0845    heparin (porcine) injection 5,000 Units  5,000 Units SubCUTAneous 3 times per day Lauren Ferreira MD   5,000 Units at 10/17/22 0622    labetalol (NORMODYNE;TRANDATE) injection 20 mg  20 mg IntraVENous Q4H PRN Lauren Ferreira MD   20 mg at 10/19/22 0415    0.9 % sodium chloride infusion   IntraVENous PRN Zuleyka Lopez MD        sodium chloride flush 0.9 % injection 5-40 mL  5-40 mL IntraVENous 2 times per day Zuleyka Lopez MD   10 mL at 10/19/22 0900    sodium chloride flush 0.9 % injection 5-40 mL  5-40 mL IntraVENous PRN Zuleyka Lopez MD        0.9 % sodium chloride infusion   IntraVENous PRN Zuleyka Lopez MD        ondansetron (ZOFRAN-ODT) disintegrating tablet 4 mg  4 mg Oral Q8H PRN Zuleyka Lopez MD        Or    ondansetron [facility]) injection 4 mg  4 mg IntraVENous Q6H PRN Zuleyka Lopez MD        polyethylene glycol [facility]) packet 17 g  17 g Oral Daily PRN Zuleyka Lopze MD        acetaminophen (TYLENOL) tablet 650 mg  650 mg Oral Q6H PRN Zuleyka Lopez MD        Or    acetaminophen (TYLENOL) suppository 650 mg  650 mg Rectal Q6H PRN Zuleyka Lopez MD        pantoprazole (PROTONIX) tablet 40 mg  40 mg Oral BID AC Zuleyka Lopez MD   40 mg at 10/19/22 0794    hydrOXYzine HCl (ATARAX) tablet 10 mg  10 mg Oral TID PRN Zuleyka Lopez MD        calcitRIOL (ROCALTROL) capsule 0.25 mcg  0.25 mcg Oral Daily [facility] Debora Angel MD   0.25 mcg at 10/19/22 0845    0.9 % sodium chloride infusion   IntraVENous PRN Lauren Ferreira MD           Past Medical History:    Past Medical History:   Diagnosis Date    Anxiety 3/28/2017    CKD (chronic kidney disease) 6/16/2017    Hyperlipidemia LDL goal <100 6/16/2017 Hypertension     Obesity (BMI 30.0-34.9) 6/16/2017       Surgical History:    Past Surgical History:   Procedure Laterality Date    IR TUNNELED CATHETER PLACEMENT GREATER THAN 5 YEARS  10/17/2022    IR TUNNELED CATHETER PLACEMENT GREATER THAN 5 YEARS 10/17/2022 [facility] SPECIAL PROCEDURES             Pre-Sedation:  Pre-Sedation Documentation and Exam:  I have personally completed a history, physical exam & review of systems for this patient (see notes). Prior History of Anesthesia Complications:   none    Modified Mallampati:  I (soft palate, uvula, fauces, tonsillar pillars visible)    ASA Classification:  Class 2 - A normal healthy patient with mild systemic disease    Ricardo Scale: Activity:  2 - Able to move 4 extremities voluntarily on command  Respiration:  2 - Able to breathe deeply and cough freely  Circulation:  2 - BP+/- 20mmHg of normal  Consciousness:  2 - Fully awake  Oxygen Saturation (color):  2 - Able to maintain oxygen saturation >92% on room air    Sedation/Anesthesia Plan:  Guard the patient's safety and welfare. Minimize physical discomfort and pain. Minimize negative psychological responses to treatment by providing sedation and analgesia and maximize the potential amnesia. Patient to meet pre-procedure discharge plan.     Medication Planned:  midazolam intravenously and fentanyl intravenously    Patient is an appropriate candidate for plan of sedation:   yes      Electronically signed by David Quintana MD on 10/19/2022 at 2:06 PM

## 2022-10-19 NOTE — DIALYSIS
Treatment time: 3 Hours  Net UF: 1999 ML    Pre weight: 113.5 Kg  Post weight: 111.5 kg  EDW: TBD kg    Access used: R Neck NTDC   Access function: Good with  ml/min    Medications or blood products given: None, pharmacy called for iron but was not received before pt came off hd.    Regular outpatient schedule: TBD, new pt. Summary of response to treatment: Tolerated tx well, BP elevated t/o but stable. Copy of dialysis treatment record placed in chart, to be scanned into EMR. Report given to [facility] RN.

## 2022-10-19 NOTE — PROGRESS NOTES
Patient: John Hou Date of Service: 2021   : 1969 MRN: [id number]     SUBJECTIVE:     HISTORY OF PRESENT ILLNESS:  John Hou is a 52 year old male who presents today for physical     Feeling ok     Here for regular physical     Has had a pain in the lower left side of his abd  Coming and going, now its always there   Going on for weeks to a month     Can be dull and achy, sometimes sharp   Always on the left side   Does not remember lifting anything     No loose stools   No relation to food     Has not had a colonoscopy     Diet: good, healthy overall   Trying to eat as well as he can     Exercise: got a peloton  Using it- not as much in July-     Next week doing a sinus procedure -    PAST MEDICAL HISTORY:  No past medical history on file.    MEDICATIONS:  Current Outpatient Medications   Medication Sig   • hydrOXYzine (ATARAX) 50 MG tablet Take 1 tablet by mouth at bedtime as needed (sleep).   • fluticasone (FLONASE) 50 MCG/ACT nasal spray Spray 2 sprays in each nostril daily.     No current facility-administered medications for this visit.       ALLERGIES:  ALLERGIES:  No Known Allergies    PAST SURGICAL HISTORY:  No past surgical history on file.    FAMILY HISTORY:  No family history on file.    SOCIAL HISTORY:  Social History     Tobacco Use   • Smoking status: Never Smoker   • Smokeless tobacco: Never Used   Substance Use Topics   • Alcohol use: Yes   • Drug use: No       Review of Systems   Constitutional: Negative.    HENT: Positive for congestion.    Respiratory: Negative.    Cardiovascular: Negative.    Genitourinary: Negative.    Hematological: Negative.    Psychiatric/Behavioral: Negative.          OBJECTIVE:     Physical Exam  Vitals and nursing note reviewed.   Constitutional:       Appearance: Normal appearance. He is well-developed.   HENT:      Head: Normocephalic and atraumatic.      Right Ear: Tympanic membrane, ear canal and external ear normal.      Left Ear: Tympanic  Pt was taken to dialysis this morning at 0900. Then he went to cath lab at approx noon.     Obie Ken RN membrane, ear canal and external ear normal.      Mouth/Throat:      Mouth: Mucous membranes are moist.   Eyes:      Conjunctiva/sclera: Conjunctivae normal.      Pupils: Pupils are equal, round, and reactive to light.   Cardiovascular:      Rate and Rhythm: Normal rate and regular rhythm.      Pulses: Normal pulses.      Heart sounds: Normal heart sounds.   Pulmonary:      Effort: Pulmonary effort is normal.      Breath sounds: Normal breath sounds. No wheezing.   Abdominal:      Palpations: Abdomen is soft.      Tenderness: There is abdominal tenderness.   Musculoskeletal:         General: Normal range of motion.      Cervical back: Normal range of motion.   Lymphadenopathy:      Cervical: No cervical adenopathy.   Skin:     General: Skin is warm and dry.   Neurological:      General: No focal deficit present.      Mental Status: He is alert and oriented to person, place, and time.   Psychiatric:         Mood and Affect: Mood normal.         Behavior: Behavior normal.         Thought Content: Thought content normal.         Visit Vitals  /70   Pulse 71   Temp 97.6 °F (36.4 °C)   Ht 5' 7\" (1.702 m)   Wt 78.5 kg (173 lb)   SpO2 98%   BMI 27.10 kg/m²         Wt Readings from Last 1 Encounters:   08/04/21 78.5 kg (173 lb)          Assessment AND PLAN:     This is a 52 year old year-old male who presents with     Diagnoses and all orders for this visit:  Health maintenance examination  -     CBC WITH DIFFERENTIAL; Future  -     COMPREHENSIVE METABOLIC PANEL; Future  -     LIPID PANEL WITHOUT REFLEX; Future  -     THYROID STIMULATING HORMONE REFLEX; Future  -     VITAMIN D -25 HYDROXY; Future  Left inguinal hernia  -     SERVICE TO SURGERY GENERAL  healthy diet  Reg exercise  Drink enough water  Check blood  Possible hernia- monitor symptoms      No follow-ups on file.    The patient indicated understanding of the diagnosis and agreed with the plan of care.      Arleth Reyna,   8/4/2021

## 2022-10-19 NOTE — PROGRESS NOTES
Hospitalist Progress Note      PCP: Brenda Patel MD    Date of Admission: 10/14/2022    Chief Complaint:  leg swell, shortness of breath      Subjective:     States that he feels improved. He denies headaches, lightheadedness, visual changes, shortness of breath, chest pain, palpitations. He also denies nausea, abdominal pain, dysuria, diarrhea. Medications:  Reviewed    Infusion Medications    sodium chloride      sodium chloride      sodium chloride       Scheduled Medications    carvedilol  25 mg Oral BID WC    NIFEdipine  60 mg Oral Daily    iron sucrose  100 mg IntraVENous Q24H    hydrALAZINE  50 mg Oral 3 times per day    isosorbide mononitrate  60 mg Oral Daily    losartan  100 mg Oral Daily    heparin (porcine)  5,000 Units SubCUTAneous 3 times per day    sodium chloride flush  5-40 mL IntraVENous 2 times per day    pantoprazole  40 mg Oral BID AC    calcitRIOL  0.25 mcg Oral Daily     PRN Meds: labetalol, sodium chloride, sodium chloride flush, sodium chloride, ondansetron **OR** ondansetron, polyethylene glycol, acetaminophen **OR** acetaminophen, hydrOXYzine HCl, sodium chloride      Intake/Output Summary (Last 24 hours) at 10/19/2022 0442  Last data filed at 10/18/2022 2131  Gross per 24 hour   Intake 190 ml   Output 0 ml   Net 190 ml         Physical Exam Performed:    BP (!) 195/105   Pulse 87   Temp 98.9 °F (37.2 °C) (Oral)   Resp 18   Ht 6' 2\" (1.88 m)   Wt 250 lb 7.1 oz (113.6 kg)   SpO2 97%   BMI 32.15 kg/m²       GEN alert, in no distress  HEENT normocephalic, anicteric sclera, EOMI, mucosa moist, no stridor  NECK supple, trachea midline  CHEST Rt Tunneled HD cath, pressure dressing.   RESP on RA, in no distress, clear to auscultation  CARDS RRR, S1, S2, no murmurs, chronic bilateral LE edema, radial pulse 2+, DP pulse  ABD +BS, soft nontender  MSK no cyanosis, no clubbing  SKIN chronic skin changes with chronic edema, warm, dry  NEURO alert, oriented x 3, no facial asymmetry, no dysarthria, moving spontaneously  PSYCH normal mood      Labs:   Recent Labs     10/16/22  0607 10/16/22  1904 10/17/22  0625 10/17/22  1931 10/18/22  0026 10/18/22  0501 10/18/22  1845   WBC 9.2  --  9.1  --   --  9.1  --    HGB 8.2*   < > 8.1*   < > 8.1* 8.4* 8.0*   HCT 23.7*   < > 24.0*   < > 25.0* 24.9* 24.3*     --  232  --   --  228  --     < > = values in this interval not displayed. Recent Labs     10/16/22  0607 10/17/22  0625 10/18/22  0501    139 141   K 2.9* 3.2* 3.5    100 102   CO2 22 22 24   BUN 87* 84* 59*   CREATININE 12.3* 12.2* 9.9*   CALCIUM 8.0* 8.6 8.2*   PHOS 6.9* 7.8* 4.9       No results for input(s): AST, ALT, BILIDIR, BILITOT, ALKPHOS in the last 72 hours. No results for input(s): INR in the last 72 hours. No results for input(s): Jacob Bigness in the last 72 hours. Urinalysis:      Lab Results   Component Value Date/Time    NITRU Negative 10/15/2022 09:27 PM    WBCUA 10-20 10/15/2022 09:27 PM    RBCUA 0-2 10/15/2022 09:27 PM    BLOODU SMALL 10/15/2022 09:27 PM    SPECGRAV 1.020 10/15/2022 09:27 PM    GLUCOSEU Negative 10/15/2022 09:27 PM       Radiology:  VL Extremity Venous Bilateral         IR TUNNELED CVC PLACE WO SQ PORT/PUMP > 5 YEARS   Final Result   1. Satisfactory ultrasound and fluoroscopic-guided placement and positioning of tunneled  right internal jugular dialysis catheter   2. Catheter tip in satisfactory positioning of right atrium and is ready for use. 3. Conscious sedation without complication         XR CHEST PORTABLE   Final Result   1. Cardiomegaly. XR CHEST (2 VW)   Final Result      Gross cardiac enlargement. .      No acute traumatic consolidation. Assessment/Plan: This is 75-year-old male with history of HTN, HLD, CKD V (not on dialysis), who presented with leg swelling and recent abnormal labs. Patient states that he began having bilateral leg swelling about 2 weeks.  He states that he has also been having a dyspnea on exertion but denies any chest pain. He also states that he has recently gained about 20 to 30 pounds. patient also states that he was called due to very low hemoglobin on recent labs. He states that he has been having dark stools. Patient denies any lightheadedness/ dizziness, nausea/vomiting, abdominal pain, diarrhea/constipation, dysuria or hematuria. Patient states that he is compliant with his home BP medications. Patient has CKD V and has had multiple referrals to nephrologist but did not go to them. On arrival patient was hypertensive with a /104. Labs showed hemoglobin of 5.7, creatinine of 13.8, and BNP >70,000. CXR showed gross cardiac enlargement with no acute traumatic consolidation. Nephrology was contacted in the ED, recommended no acute indication for emergent dialysis and he was given 80mg IV lasix. He was also given 1g IV ceftriaxone for suspected upper GI bleed. Active Hospital Problems    Diagnosis Date Noted    Anemia [D64.9] 10/16/2022     Priority: Medium    Leg swelling [M79.89] 10/16/2022     Priority: Medium    Acute renal failure (ARF) ([facility] [address] [N17.9] 10/15/2022     Priority: Medium    ESRD (end stage renal disease) ([facility] [address] [N18.6] 10/15/2022     Priority: Medium    CKD (chronic kidney disease) [N18.9] 06/16/2017    Essential hypertension [I10] 12/14/2016     New presentation of ESRD  - Started HD run #1 10/16; run #2 on 10/17.  - s/p  tunneled RI HD Cath 10/17/22. - Plan for HD on 10/19.    - Needs dialysis chair. Normocytic Anemia, related to CKD vs Upper GI bleed  - adm HgB 5.7 on 10/15/22. MCV 89.9.   Baseline HgB 12.8.  -S/p 3 units PRBCs w/ Hgb in the 8.  -Trend Hgb q12h today; can probably space to qd tomorrow if stable  - Continue protonix 40 mg PO BID.  - GI consulted, no acute intervention indicated currently; will re-enngage if clinical changes  -Resume diet     New Acute HFrEF  New CM  Abnormal EKG  Patient presented with bilateral leg swelling, weight gain, and shortness of breath on exertion. On presentation, BNP >70,000. CXR showed gross cardiac enlargement with no acute traumatic consolidation.  -Echocardiogram 10/15/22 showed normal LV size, mild concentric LVH, mild-mod decreased LVEF 4045%. Global hypokinesis. Grade III diastolic dysfunction with elevated LV filling pressures. LA and RA dilated. Mild valvular disease.  -Cardiology consulted. Plan for coronary angiogram on 10/19. .  - HD for volume control.  - Strict I's and O, Daily weights     Severe Hypertension  - Home on toprol  mg daily, lasix 40 mg, hydralazine 100 mg twice daily, amlodipine 5 mg daily.  - Increase carvedilol to 25 mg BID.  - Continue losartan 100 mg qd, amlodipine 10 mg daily. - start hydralazine PO 50 mg Q8H  - start imdur 60 mg daily. Diet: ADULT DIET; Regular; Low Sodium (2 gm); Low Potassium (Less than 3000 mg/day); Low Phosphorus (Less than 1000 mg)  Code Status: Full Code  PT/OT Eval Status: N/A    DVT Prophylaxis: SCD, acute anemia  Diet: ADULT DIET; Regular; Low Sodium (2 gm);  Low Phosphorus (Less than 1000 mg)  Code Status: Full Code    PT/OT Eval Status:     Dispo - TBD    Klaus Ortiz MD

## 2022-10-19 NOTE — PLAN OF CARE
Problem: Discharge Planning  Goal: Discharge to home or other facility with appropriate resources  10/19/2022 0753 by German Warren RN  Outcome: Progressing  10/18/2022 1933 by Wendi Osborne RN  Outcome: Progressing  Flowsheets (Taken 10/18/2022 1933)  Discharge to home or other facility with appropriate resources:   Identify barriers to discharge with patient and caregiver   Identify discharge learning needs (meds, wound care, etc)     Problem: Safety - Adult  Goal: Free from fall injury  10/19/2022 0753 by German Warren RN  Outcome: Progressing  10/18/2022 1933 by Wendi Osborne RN  Outcome: Adequate for Discharge  Note: No falls noted thus far this shift, bed in lowest position, non-skid socks on, call light within reach, hourly checks, safety maintained, will continue to monitor. Pt educated on safety and being up ad reynaldo. Problem: Skin/Tissue Integrity  Goal: Absence of new skin breakdown  Description: 1. Monitor for areas of redness and/or skin breakdown  2. Assess vascular access sites hourly  3. Every 4-6 hours minimum:  Change oxygen saturation probe site  4. Every 4-6 hours:  If on nasal continuous positive airway pressure, respiratory therapy assess nares and determine need for appliance change or resting period. 10/19/2022 0753 by German Warren RN  Outcome: Progressing  10/18/2022 1933 by Wendi Osborne RN  Outcome: Adequate for Discharge     Problem: Cardiovascular - Adult  Goal: Maintains optimal cardiac output and hemodynamic stability  10/19/2022 0154 by Melissa Marte RN  Outcome: Progressing  Flowsheets (Taken 10/19/2022 0154)  Maintains optimal cardiac output and hemodynamic stability:   Monitor blood pressure and heart rate   Monitor urine output and notify Licensed Independent Practitioner for values outside of normal range   Assess for signs of decreased cardiac output  Note: Pt's BP elevated. NSR on telemetry. Denies chest pain and SOB. Will continue to monitor.    10/18/2022 1933 by Zelda Phalen, RN  Outcome: Progressing  Flowsheets (Taken 10/18/2022 1933)  Maintains optimal cardiac output and hemodynamic stability:   Monitor blood pressure and heart rate   Monitor urine output and notify Licensed Independent Practitioner for values outside of normal range   Assess for signs of decreased cardiac output   Administer vasoactive medications as ordered  Goal: Absence of cardiac dysrhythmias or at baseline  10/19/2022 0154 by David Diana RN  Outcome: Progressing  Flowsheets (Taken 10/19/2022 0154)  Absence of cardiac dysrhythmias or at baseline:   Monitor cardiac rate and rhythm   Assess for signs of decreased cardiac output     Problem: Metabolic/Fluid and Electrolytes - Adult  Goal: Hemodynamic stability and optimal renal function maintained  10/19/2022 0154 by David Diana RN  Outcome: Progressing  Flowsheets (Taken 10/19/2022 0154)  Hemodynamic stability and optimal renal function maintained:   Monitor intake, output and patient weight   Monitor labs and assess for signs and symptoms of volume excess or deficit

## 2022-10-19 NOTE — PLAN OF CARE
Problem: Cardiovascular - Adult  Goal: Maintains optimal cardiac output and hemodynamic stability  10/19/2022 0154 by Melissa Marte RN  Outcome: Progressing  Flowsheets (Taken 10/19/2022 0154)  Maintains optimal cardiac output and hemodynamic stability:   Monitor blood pressure and heart rate   Monitor urine output and notify Licensed Independent Practitioner for values outside of normal range   Assess for signs of decreased cardiac output  Note: Pt's BP elevated. NSR on telemetry. Denies chest pain and SOB. Will continue to monitor.       Problem: Metabolic/Fluid and Electrolytes - Adult  Goal: Hemodynamic stability and optimal renal function maintained  Outcome: Progressing  Flowsheets (Taken 10/19/2022 0154)  Hemodynamic stability and optimal renal function maintained:   Monitor intake, output and patient weight   Monitor labs and assess for signs and symptoms of volume excess or deficit

## 2022-10-19 NOTE — PROGRESS NOTES
Nephrology Consult Note        [facility]    [email or web address]       Phone: [phone]                                                  10/19/2022 8:10 AM     Patient: Brittney Mann [id number]  [id number]  Date of Admit: 10/14/2022 LOS: 4 days        Interval History and Plan:  Seen patient during dialysis  Patient comfortable on room air  No complaints. Oriented x 3  K 3.4  Bicarbonate OK  Hb stable   No SOB on room air  BP remain elevated        HD today with 2- 3 liter UF  Monitor BP after UF   Continue Carvedilol, Losartan, Nifedipine. Monitor BP and will adjust medications further   Continue Calcitriol  Monitor Hb and transfuse as needed. IV iron. Will start Epogen once BP is better controlled. Avoid nephrotoxins  Monitor input, output and weight  Monitor labs and vitals closely  Renally dose all medications  Requested [facility] for dialysis placement. Awaiting placement. Will discuss with patient about PD after coronary angiogram.     D/W patient and dialysis nurse. Thank you for allowing us to participate in this patient's care    In case of any question please call us at our 24 hour answering service [phone] or from 7 AM to 5 PM via [employer] or cell number    Talon Guzman MD  [facility]  [facility] [address], [address]  Fax: [phone]  Office: [phone]       Assessment & Plan     Renal function:    Chronic Kidney Disease 5/ESRD  Cr 13    Started dialysis this admission. Electrolytes:  Lab Results   Component Value Date    CREATININE 10.5 (HH) 10/19/2022    BUN 60 (H) 10/19/2022     10/19/2022    K 3.4 (L) 10/19/2022     10/19/2022    CO2 24 10/19/2022            # CKD- MBD:   Secondary hyperparathyroidism due to renal failure  Monitor Phos level while here.    Lab Results   Component Value Date    .5 (H) 10/14/2022    CALCIUM 8.2 (L) 10/19/2022    PHOS 4.9 10/18/2022       Acid Base Status: Stable      Volume status/BP:  BP elevated. Volume overloaded with chronic edema but comfortable on room air. Intake/Output Summary (Last 24 hours) at 10/19/2022 0810  Last data filed at 10/19/2022 9066  Gross per 24 hour   Intake 210 ml   Output 400 ml   Net -190 ml           Hematology:   CKD related anemia  Goal Hgb 10-11    Lab Results   Component Value Date    IRON 18 (L) 10/15/2022    TIBC 242 (L) 10/15/2022    FERRITIN 130.2 10/15/2022     Lab Results   Component Value Date    WBC 13.5 (H) 10/19/2022    HGB 7.8 (L) 10/19/2022    HCT 23.2 (L) 10/19/2022    MCV 88.7 10/19/2022     10/19/2022             Reason for Consult and Chief Complaint     Reason for consult: ESRD    Chief complaint:   Chief Complaint   Patient presents with    Leg Swelling     C/o on going leg swelling for a \"few weeks\" saw PCP and sent to get blood work today    Anemia     PCP called tonight and sent to ED d/t blood count low, patient states told \"5.0\" patient states sob, and dark stools for few days        History of Present Illness   Jose Banks is a 55 y.o. with PMH significant for CKD 5 was admitted with leg swelling, exertional SOB and low Hb. No chest pain. No SOB at rest. Patient also gaining weight. Patient reported some black stools. No focal weakness. Review of Systems   Positive in bold or unable to assess     GEN: Fever, chills, night sweats. HEENT: Changes in vision, sore throat, rhinorrhea   CVS: Chest pain, palpitations,swelling or edema in legs  Pulmonary: Cough, hemoptysis, SOB   GI: Nausea, vomiting, diarrhea, constipation, abdominal pain  : Bladder incontinence, dysuria,hematuria. MSK: Muscle or joint or bone pains  Skin: Rashes, ulcers, skin thickness  CNS: Headache, dizziness, confusion, focal weakness, seizure. Psych: Anxiety, agitation, depression. Reviewed all 12 systems, negative except as above.      Past Medical History     Past Medical History:   Diagnosis Date    Anxiety 3/28/2017    CKD (chronic kidney disease) 6/16/2017    Hyperlipidemia LDL goal <100 6/16/2017    Hypertension     Obesity (BMI 30.0-34.9) 6/16/2017         Past Surgical History     Past Surgical History:   Procedure Laterality Date    IR TUNNELED CATHETER PLACEMENT GREATER THAN 5 YEARS  10/17/2022    IR TUNNELED CATHETER PLACEMENT GREATER THAN 5 YEARS 10/17/2022 [facility] SPECIAL PROCEDURES         Family History     History reviewed. No pertinent family history. Social History     Social History     Tobacco Use    Smoking status: Some Days     Years: 4.00     Types: Cigarettes    Smokeless tobacco: Never    Tobacco comments:      2 a week   Substance Use Topics    Alcohol use: No          Past medical, family, and social histories were reviewed as previously documented. Updates were made as necessary. Inpatient Medications and Allergies       Scheduled Meds:   potassium chloride  40 mEq Oral Once    carvedilol  25 mg Oral BID WC    NIFEdipine  60 mg Oral Daily    iron sucrose  100 mg IntraVENous Q24H    hydrALAZINE  50 mg Oral 3 times per day    isosorbide mononitrate  60 mg Oral Daily    losartan  100 mg Oral Daily    heparin (porcine)  5,000 Units SubCUTAneous 3 times per day    sodium chloride flush  5-40 mL IntraVENous 2 times per day    pantoprazole  40 mg Oral BID AC    calcitRIOL  0.25 mcg Oral Daily       Allergies: No Known Allergies      Vital Signs     Vitals:    10/19/22 0617   BP: (!) 180/105   Pulse:    Resp:    Temp:    SpO2:          Intake/Output Summary (Last 24 hours) at 10/19/2022 0810  Last data filed at 10/19/2022 0067  Gross per 24 hour   Intake 210 ml   Output 400 ml   Net -190 ml           Physical Exam     General appearance: NAD  Head: Normocephalic, without obvious abnormality, atraumatic   Mouth: Moist mucous membrane  Neck: Supple. Lungs: Good air entry bilaterally. No respiratory distress on RA.    Heart: S1, S2.  Abdomen: soft, non-tender non-distended  Extremities:Chronic appearing significant leg edema  Skin: No concerning rashes noted  Psych: good eye contact, normal affect  Neuro: AAO x 3, non focal.       Laboratory Data     Please see above     Diagnostic Studies   Pertinent images reviewed

## 2022-10-19 NOTE — PROGRESS NOTES
Hospitalist Progress Note      PCP: Jo Ann Samson MD    Date of Admission: 10/14/2022    Chief Complaint:  leg swell, shortness of breath      Subjective:     Patient was seen and examined at [facility]  Undergoing HD today     States that he feels improved. He denies headaches, lightheadedness, visual changes, shortness of breath, chest pain, palpitations. He also denies nausea, abdominal pain, dysuria, diarrhea. Medications:  Reviewed    Infusion Medications    sodium chloride      sodium chloride      sodium chloride       Scheduled Medications    carvedilol  25 mg Oral BID WC    NIFEdipine  60 mg Oral Daily    iron sucrose  100 mg IntraVENous Q24H    hydrALAZINE  50 mg Oral 3 times per day    isosorbide mononitrate  60 mg Oral Daily    losartan  100 mg Oral Daily    heparin (porcine)  5,000 Units SubCUTAneous 3 times per day    sodium chloride flush  5-40 mL IntraVENous 2 times per day    pantoprazole  40 mg Oral BID AC    calcitRIOL  0.25 mcg Oral Daily     PRN Meds: labetalol, sodium chloride, sodium chloride flush, sodium chloride, ondansetron **OR** ondansetron, polyethylene glycol, acetaminophen **OR** acetaminophen, hydrOXYzine HCl, sodium chloride      Intake/Output Summary (Last 24 hours) at 10/19/2022 1020  Last data filed at 10/19/2022 0626  Gross per 24 hour   Intake 210 ml   Output 400 ml   Net -190 ml         Physical Exam Performed:    BP (!) 181/101   Pulse 92   Temp 98.6 °F (37 °C) (Oral)   Resp 18   Ht 6' 2\" (1.88 m)   Wt 250 lb 7.1 oz (113.6 kg)   SpO2 99%   BMI 32.15 kg/m²       GEN alert, in no distress  HEENT normocephalic, anicteric sclera, EOMI, mucosa moist, no stridor  NECK supple, trachea midline  CHEST Rt Tunneled HD cath, pressure dressing.   RESP on RA, in no distress, clear to auscultation  CARDS RRR, S1, S2, no murmurs, chronic bilateral LE edema, radial pulse 2+, DP pulse  ABD +BS, soft nontender  MSK no cyanosis, no clubbing  SKIN chronic skin changes with chronic edema, warm, dry  NEURO alert, oriented x 3, no facial asymmetry, no dysarthria, moving spontaneously  PSYCH normal mood      Labs:   Recent Labs     10/17/22  0625 10/17/22  1931 10/18/22  0501 10/18/22  1845 10/19/22  0506   WBC 9.1  --  9.1  --  13.5*   HGB 8.1*   < > 8.4* 8.0* 7.8*   HCT 24.0*   < > 24.9* 24.3* 23.2*     --  228  --  206    < > = values in this interval not displayed. Recent Labs     10/17/22  0625 10/18/22  0501 10/19/22  0507    141 141   K 3.2* 3.5 3.4*    102 104   CO2 22 24 24   BUN 84* 59* 60*   CREATININE 12.2* 9.9* 10.5*   CALCIUM 8.6 8.2* 8.2*   PHOS 7.8* 4.9  --        No results for input(s): AST, ALT, BILIDIR, BILITOT, ALKPHOS in the last 72 hours. No results for input(s): INR in the last 72 hours. No results for input(s): Jayesh Chau in the last 72 hours. Urinalysis:      Lab Results   Component Value Date/Time    NITRU Negative 10/15/2022 09:27 PM    WBCUA 10-20 10/15/2022 09:27 PM    RBCUA 0-2 10/15/2022 09:27 PM    BLOODU SMALL 10/15/2022 09:27 PM    SPECGRAV 1.020 10/15/2022 09:27 PM    GLUCOSEU Negative 10/15/2022 09:27 PM       Radiology:  VL Extremity Venous Bilateral         IR TUNNELED CVC PLACE WO SQ PORT/PUMP > 5 YEARS   Final Result   1. Satisfactory ultrasound and fluoroscopic-guided placement and positioning of tunneled  right internal jugular dialysis catheter   2. Catheter tip in satisfactory positioning of right atrium and is ready for use. 3. Conscious sedation without complication         XR CHEST PORTABLE   Final Result   1. Cardiomegaly. XR CHEST (2 VW)   Final Result      Gross cardiac enlargement. .      No acute traumatic consolidation. Assessment/Plan: This is 70-year-old male with history of HTN, HLD, CKD V (not on dialysis), who presented with leg swelling and recent abnormal labs. Patient states that he began having bilateral leg swelling about 2 weeks.  He states that he has also been having a dyspnea on exertion but denies any chest pain. He also states that he has recently gained about 20 to 30 pounds. patient also states that he was called due to very low hemoglobin on recent labs. He states that he has been having dark stools. Patient denies any lightheadedness/ dizziness, nausea/vomiting, abdominal pain, diarrhea/constipation, dysuria or hematuria. Patient states that he is compliant with his home BP medications. Patient has CKD V and has had multiple referrals to nephrologist but did not go to them. On arrival patient was hypertensive with a /104. Labs showed hemoglobin of 5.7, creatinine of 13.8, and BNP >70,000. CXR showed gross cardiac enlargement with no acute traumatic consolidation. Nephrology was contacted in the ED, recommended no acute indication for emergent dialysis and he was given 80mg IV lasix. He was also given 1g IV ceftriaxone for suspected upper GI bleed. Active Hospital Problems    Diagnosis Date Noted    Anemia [D64.9] 10/16/2022     Priority: Medium    Leg swelling [M79.89] 10/16/2022     Priority: Medium    Acute renal failure (ARF) ([facility] [address] [N17.9] 10/15/2022     Priority: Medium    ESRD (end stage renal disease) ([facility] [address] [N18.6] 10/15/2022     Priority: Medium    CKD (chronic kidney disease) [N18.9] 06/16/2017    Essential hypertension [I10] 12/14/2016     New presentation of ESRD  - Started HD run #1 10/16; run #2 on 10/17.  - s/p  tunneled RIJ HD Cath 10/17/22. - Plan for HD today   - Needs dialysis chair. Normocytic Anemia, related to CKD vs Upper GI bleed  - adm HgB 5.7 on 10/15/22. MCV 89.9.   Baseline HgB 12.8.  -S/p 3 units PRBCs w/ Hgb in the 8.  -Trend Hgb q12h today; can probably space to qd tomorrow if stable  - Continue protonix 40 mg PO BID.  - GI consulted, no acute intervention indicated currently; will re-enngage if clinical changes  -Resume diet  On iv iron     Develop leukocytosis today   No fever  Sepsis work up include LA and blood cx  Crp and procal       New Acute HFrEF  New CM  Abnormal EKG  Patient presented with bilateral leg swelling, weight gain, and shortness of breath on exertion. On presentation, BNP >70,000. CXR showed gross cardiac enlargement with no acute traumatic consolidation.  -Echocardiogram 10/15/22 showed normal LV size, mild concentric LVH, mild-mod decreased LVEF 4045%. Global hypokinesis. Grade III diastolic dysfunction with elevated LV filling pressures. LA and RA dilated. Mild valvular disease.  -Cardiology consulted. Plan for coronary angiogram on 10/19. .  - HD for volume control.  - Strict I's and O, Daily weights     Severe Hypertension  - Home on toprol  mg daily, lasix 40 mg, hydralazine 100 mg twice daily, amlodipine 5 mg daily.  - Increase carvedilol to 25 mg BID.  - Continue losartan 100 mg qd, amlodipine 10 mg daily. - start hydralazine PO 50 mg Q8H  - start imdur 60 mg daily. Diet: ADULT DIET; Regular; Low Sodium (2 gm); Low Potassium (Less than 3000 mg/day); Low Phosphorus (Less than 1000 mg)  Code Status: Full Code  PT/OT Eval Status: N/A    DVT Prophylaxis: SCD, acute anemia  Diet: ADULT DIET; Regular; Low Sodium (2 gm);  Low Phosphorus (Less than 1000 mg)  Code Status: Full Code    PT/OT Eval Status:     Dispo - TBD    Twan Lin MD

## 2022-10-19 NOTE — PROGRESS NOTES
Pt came back to his room at 1415. Postcath protocol followed. Vitals stable during shift. TR band removed at 1600. Site is dry, clean, intatc. Radial pulses +2 at TR removal time. Blood pressure (!) 154/88, pulse 85, temperature 98 °F (36.7 °C), temperature source Oral, resp. rate 18, height 6' 2\" (1.88 m), weight 250 lb 3.6 oz (113.5 kg), SpO2 95 %.     Chris Perez RN

## 2022-10-19 NOTE — PROCEDURES
[facility]                                                LEFT HEART CATH    Maria De Jesus Day   55 y.o., male  1975      10/19/2022    Procedure performed by Dr. Angel aMrch MD, [facility]  Surgical assistants :none    Procedure  Selective Coronary Angiography  Cardiac Catheterization for Coronary Anatomy  Left Heart Catheterization  Left Ventriculogram  Radial artery access assisted by ultrasound  Arterial Access Right Radial Artery after a negative Floyd test  TR Band    Any and all anesthesia was administered by my staff under my direct supervision and with me personally monitoring the patient    Indication:Cardiac cath to rule out ischemic CAD, Possible angioplasty, The procedure and risks described to patient including risk of CVA, MI, bleeding, emergency surgery, death, patient with acute on chronic renal failure. Cardiomyopathy. Cardiac cath 2 determine if there is coronary disease. , or Consent signed  Unspecified Angina  Anesthesia: Moderate sedation with Versed and Fentanyl IV  Anesthesia Start time 4434  Anesthesia end time 1343  Estimated blood loss :minimal    Specimen removed: NONE    Abnormal Stress Test      Procedure Description:  After written informed consent was obtained, the patient was   brought to the cardiac catheterization suite, where patient was prepped and   draped in the usual sterile fashion. Local anesthesia was achieved in the   right wrist with 2% lidocaine. A 5-Japanese hemostasis sheath was placed into   the radial artery. The pre cocktail of heparin, verapamil and nitroglycerin was injected into the sheath    The JR4 catheter was introduced  to engage the right coronary   artery. Radiographic  images were obtained.  The catheter was removed and exchanged over an exchange length 0.35 soft guide wire. .         A 5-Nigerian JL 3.5 catheter was introduced; used to engage the left main   coronary artery. Radiographic  images were obtained. The catheter was pulled back . The JR4 diagnostic catheter was placed across the aortic valve and a hand-injection of contrast was made for an LV gram.  On pullback there was no gradient. The hemostasis sheath was removed and hemostasis was achieved using a TR Band     There were no complications. Patient tolerated the procedure well. The   patient was transferred to the holding area in stable condition. Contrast consumed 130 cc  Flouro time 7.8 minutes    Results:  Left ventricular pressure 22 mmHg  Aortic pressure 156/96 mmHg    Coronary anatomy:   The left main coronary artery is normal.     Left anterior descending artery is normal    Circumflex artery is normal.    The right coronary artery is a dominant vessel and normal.     Left ventriculogram shows ejection fraction of 40% .    global   Dilated cardiomyopathy with reduced ejection fraction    Impression:  Normal coronary arteries with right dominance   positive Complications: none      Plan:  Dialysis  Primary prevention  Medical management      This note was likely completed using voice recognition technology and may contain unintended errors  Lavinia Green MD , Bro Resendiz; Patrick Willett MD

## 2022-10-19 NOTE — CARE COORDINATION
CM following for discharge planning. Pt will be new HD with no insurance and does not qualify for Medicaid. Pt should now qualify for Medicare with ESRD dx. Medicare information and phone number to ss given to wife to get started applying for Medicare. Yulisa served Dr Mamadou Nguyễn regarding in home HD as Medicare once approved would cover right away, if outpt HD, insurance will not take affect until first day of the fourth month of HD. Await response. Pt's wife stated she would be more interested in learning in home HD as she is  not working and could help and pt would like to return to work.      Bijal Geller RN, BSN, [address]  Case Management Department  [phone]

## 2022-10-20 LAB
A/G RATIO: 1.1 (ref 1.1–2.2)
ALBUMIN SERPL-MCNC: 3.2 G/DL (ref 3.4–5)
ALP BLD-CCNC: 97 U/L (ref 40–129)
ALT SERPL-CCNC: 6 U/L (ref 10–40)
ANION GAP SERPL CALCULATED.3IONS-SCNC: 11 MMOL/L (ref 3–16)
AST SERPL-CCNC: 12 U/L (ref 15–37)
BASOPHILS ABSOLUTE: 0.1 K/UL (ref 0–0.2)
BASOPHILS RELATIVE PERCENT: 0.9 %
BILIRUB SERPL-MCNC: 0.5 MG/DL (ref 0–1)
BUN BLDV-MCNC: 33 MG/DL (ref 7–20)
CALCIUM SERPL-MCNC: 8.5 MG/DL (ref 8.3–10.6)
CHLORIDE BLD-SCNC: 101 MMOL/L (ref 99–110)
CO2: 25 MMOL/L (ref 21–32)
CREAT SERPL-MCNC: 7.9 MG/DL (ref 0.9–1.3)
EOSINOPHILS ABSOLUTE: 0.4 K/UL (ref 0–0.6)
EOSINOPHILS RELATIVE PERCENT: 2.7 %
GFR SERPL CREATININE-BSD FRML MDRD: 8 ML/MIN/{1.73_M2}
GLUCOSE BLD-MCNC: 111 MG/DL (ref 70–99)
HCT VFR BLD CALC: 23.4 % (ref 40.5–52.5)
HEMOGLOBIN: 7.5 G/DL (ref 13.5–17.5)
LYMPHOCYTES ABSOLUTE: 1.4 K/UL (ref 1–5.1)
LYMPHOCYTES RELATIVE PERCENT: 10.2 %
MAGNESIUM: 1.9 MG/DL (ref 1.8–2.4)
MCH RBC QN AUTO: 29.2 PG (ref 26–34)
MCHC RBC AUTO-ENTMCNC: 32.2 G/DL (ref 31–36)
MCV RBC AUTO: 90.8 FL (ref 80–100)
MONOCYTES ABSOLUTE: 1.3 K/UL (ref 0–1.3)
MONOCYTES RELATIVE PERCENT: 9.5 %
NEUTROPHILS ABSOLUTE: 10.5 K/UL (ref 1.7–7.7)
NEUTROPHILS RELATIVE PERCENT: 76.7 %
PDW BLD-RTO: 15.8 % (ref 12.4–15.4)
PLATELET # BLD: 212 K/UL (ref 135–450)
PMV BLD AUTO: 8.6 FL (ref 5–10.5)
POTASSIUM REFLEX MAGNESIUM: 3.9 MMOL/L (ref 3.5–5.1)
POTASSIUM SERPL-SCNC: 3.9 MMOL/L (ref 3.5–5.1)
RBC # BLD: 2.57 M/UL (ref 4.2–5.9)
SODIUM BLD-SCNC: 137 MMOL/L (ref 136–145)
TOTAL PROTEIN: 6.2 G/DL (ref 6.4–8.2)
WBC # BLD: 13.7 K/UL (ref 4–11)

## 2022-10-20 PROCEDURE — 6370000000 HC RX 637 (ALT 250 FOR IP): Performed by: STUDENT IN AN ORGANIZED HEALTH CARE EDUCATION/TRAINING PROGRAM

## 2022-10-20 PROCEDURE — 6370000000 HC RX 637 (ALT 250 FOR IP)

## 2022-10-20 PROCEDURE — 2580000003 HC RX 258: Performed by: STUDENT IN AN ORGANIZED HEALTH CARE EDUCATION/TRAINING PROGRAM

## 2022-10-20 PROCEDURE — 2580000003 HC RX 258: Performed by: INTERNAL MEDICINE

## 2022-10-20 PROCEDURE — 6370000000 HC RX 637 (ALT 250 FOR IP): Performed by: INTERNAL MEDICINE

## 2022-10-20 PROCEDURE — 83735 ASSAY OF MAGNESIUM: CPT

## 2022-10-20 PROCEDURE — 6360000002 HC RX W HCPCS: Performed by: STUDENT IN AN ORGANIZED HEALTH CARE EDUCATION/TRAINING PROGRAM

## 2022-10-20 PROCEDURE — 85025 COMPLETE CBC W/AUTO DIFF WBC: CPT

## 2022-10-20 PROCEDURE — 80053 COMPREHEN METABOLIC PANEL: CPT

## 2022-10-20 PROCEDURE — 36415 COLL VENOUS BLD VENIPUNCTURE: CPT

## 2022-10-20 PROCEDURE — 2060000000 HC ICU INTERMEDIATE R&B

## 2022-10-20 PROCEDURE — 2580000003 HC RX 258

## 2022-10-20 RX ORDER — TORSEMIDE 20 MG/1
60 TABLET ORAL DAILY
Status: DISCONTINUED | OUTPATIENT
Start: 2022-10-20 | End: 2022-10-21 | Stop reason: HOSPADM

## 2022-10-20 RX ADMIN — CARVEDILOL 25 MG: 25 TABLET, FILM COATED ORAL at 18:27

## 2022-10-20 RX ADMIN — ONDANSETRON 4 MG: 4 TABLET, ORALLY DISINTEGRATING ORAL at 09:32

## 2022-10-20 RX ADMIN — TORSEMIDE 60 MG: 20 TABLET ORAL at 12:33

## 2022-10-20 RX ADMIN — CALCITRIOL CAPSULES 0.25 MCG 0.25 MCG: 0.25 CAPSULE ORAL at 09:18

## 2022-10-20 RX ADMIN — SODIUM CHLORIDE, PRESERVATIVE FREE 10 ML: 5 INJECTION INTRAVENOUS at 09:22

## 2022-10-20 RX ADMIN — HYDRALAZINE HYDROCHLORIDE 50 MG: 50 TABLET, FILM COATED ORAL at 15:17

## 2022-10-20 RX ADMIN — PANTOPRAZOLE SODIUM 40 MG: 40 TABLET, DELAYED RELEASE ORAL at 15:17

## 2022-10-20 RX ADMIN — NIFEDIPINE 60 MG: 60 TABLET, FILM COATED, EXTENDED RELEASE ORAL at 09:18

## 2022-10-20 RX ADMIN — SODIUM CHLORIDE, PRESERVATIVE FREE 10 ML: 5 INJECTION INTRAVENOUS at 22:31

## 2022-10-20 RX ADMIN — HEPARIN SODIUM 5000 UNITS: 5000 INJECTION INTRAVENOUS; SUBCUTANEOUS at 15:17

## 2022-10-20 RX ADMIN — PANTOPRAZOLE SODIUM 40 MG: 40 TABLET, DELAYED RELEASE ORAL at 05:01

## 2022-10-20 RX ADMIN — HYDRALAZINE HYDROCHLORIDE 50 MG: 50 TABLET, FILM COATED ORAL at 05:01

## 2022-10-20 RX ADMIN — HEPARIN SODIUM 5000 UNITS: 5000 INJECTION INTRAVENOUS; SUBCUTANEOUS at 22:30

## 2022-10-20 RX ADMIN — CARVEDILOL 25 MG: 25 TABLET, FILM COATED ORAL at 09:18

## 2022-10-20 RX ADMIN — HEPARIN SODIUM 5000 UNITS: 5000 INJECTION INTRAVENOUS; SUBCUTANEOUS at 05:01

## 2022-10-20 RX ADMIN — ISOSORBIDE MONONITRATE 60 MG: 60 TABLET, EXTENDED RELEASE ORAL at 09:17

## 2022-10-20 RX ADMIN — LOSARTAN POTASSIUM 100 MG: 100 TABLET, FILM COATED ORAL at 09:17

## 2022-10-20 RX ADMIN — IRON SUCROSE 100 MG: 20 INJECTION, SOLUTION INTRAVENOUS at 09:22

## 2022-10-20 ASSESSMENT — PAIN SCALES - GENERAL
PAINLEVEL_OUTOF10: 0
PAINLEVEL_OUTOF10: 0

## 2022-10-20 NOTE — PLAN OF CARE
Problem: Discharge Planning  Goal: Discharge to home or other facility with appropriate resources  10/20/2022 1948 by Yanet Archibald RN  Outcome: Progressing  10/20/2022 1901 by Yanet Archibald RN  Outcome: Progressing     Problem: Discharge Planning  Goal: Discharge to home or other facility with appropriate resources  10/20/2022 1948 by Yanet Archibald RN  Outcome: Progressing  10/20/2022 1901 by Yanet Archibald RN  Outcome: Progressing     Problem: Safety - Adult  Goal: Free from fall injury  10/20/2022 1948 by Yanet Archibald RN  Outcome: Progressing  10/20/2022 1901 by Yanet Archibald RN  Outcome: Progressing     Problem: Skin/Tissue Integrity  Goal: Absence of new skin breakdown  Description: 1. Monitor for areas of redness and/or skin breakdown  2. Assess vascular access sites hourly  3. Every 4-6 hours minimum:  Change oxygen saturation probe site  4. Every 4-6 hours:  If on nasal continuous positive airway pressure, respiratory therapy assess nares and determine need for appliance change or resting period.   10/20/2022 1948 by Yanet Archibald RN  Outcome: Progressing  10/20/2022 1901 by Yanet Archibald RN  Outcome: Progressing

## 2022-10-20 NOTE — PROGRESS NOTES
[facility]  -  for Dr. Mariama Wang    Asked to reassess for a dropping Hgb  On admission, some history of dark stool  Stool guaiac positive  On IV iron, po pantoprazole  As a minimum, the patient will require an EGD  HD scheduled for tomorrow  I can schedule an EGD in the late afternoon tomorrow depending on the timing of HD

## 2022-10-20 NOTE — PLAN OF CARE
Problem: Skin/Tissue Integrity  Goal: Absence of new skin breakdown  Description: 1. Monitor for areas of redness and/or skin breakdown  2. Assess vascular access sites hourly  3. Every 4-6 hours minimum:  Change oxygen saturation probe site  4. Every 4-6 hours:  If on nasal continuous positive airway pressure, respiratory therapy assess nares and determine need for appliance change or resting period. Outcome: Progressing  Note: No new skin breakdown noted. R radial cath site soft. No numbness or tingling reported. Problem: Cardiovascular - Adult  Goal: Maintains optimal cardiac output and hemodynamic stability  Outcome: Progressing  Flowsheets (Taken 10/19/2022 0154 by Kirsten Shaffer RN)  Maintains optimal cardiac output and hemodynamic stability:   Monitor blood pressure and heart rate   Monitor urine output and notify Licensed Independent Practitioner for values outside of normal range   Assess for signs of decreased cardiac output  Note: SR on tele. Pt denies shortness of breath or chest pain and is encouraged to notify RN if experiencing. Problem: Metabolic/Fluid and Electrolytes - Adult  Goal: Hemodynamic stability and optimal renal function maintained  Outcome: Progressing  Flowsheets (Taken 10/19/2022 0154 by Kirsten Shaffer RN)  Hemodynamic stability and optimal renal function maintained:   Monitor intake, output and patient weight   Monitor labs and assess for signs and symptoms of volume excess or deficit  Note: Pt received dialysis today via R tunneled catheter. VSS.  HD MWF

## 2022-10-20 NOTE — PROGRESS NOTES
Hospitalist Progress Note      PCP: Abbey Almendarez MD    Date of Admission: 10/14/2022    Chief Complaint:  leg swell, shortness of breath      Subjective:     Patient was seen and examined at [facility] today 7.5 despite being on iv iron  Concern about possible GI bleeding  Consult GI      States that he feels improved. He denies headaches, lightheadedness, visual changes, shortness of breath, chest pain, palpitations. He also denies nausea, abdominal pain, dysuria, diarrhea. Medications:  Reviewed    Infusion Medications    sodium chloride      sodium chloride      sodium chloride      sodium chloride       Scheduled Medications    torsemide  60 mg Oral Daily    sodium chloride flush  5-40 mL IntraVENous 2 times per day    carvedilol  25 mg Oral BID WC    NIFEdipine  60 mg Oral Daily    iron sucrose  100 mg IntraVENous Q24H    hydrALAZINE  50 mg Oral 3 times per day    isosorbide mononitrate  60 mg Oral Daily    losartan  100 mg Oral Daily    heparin (porcine)  5,000 Units SubCUTAneous 3 times per day    sodium chloride flush  5-40 mL IntraVENous 2 times per day    pantoprazole  40 mg Oral BID AC    calcitRIOL  0.25 mcg Oral Daily     PRN Meds: heparin (porcine), sodium chloride flush, sodium chloride, acetaminophen, labetalol, sodium chloride, sodium chloride flush, sodium chloride, ondansetron **OR** ondansetron, polyethylene glycol, hydrOXYzine HCl, sodium chloride      Intake/Output Summary (Last 24 hours) at 10/20/2022 1032  Last data filed at 10/20/2022 0431  Gross per 24 hour   Intake 400 ml   Output 0 ml   Net 400 ml         Physical Exam Performed:    BP (!) 174/93   Pulse 92   Temp 98.4 °F (36.9 °C) (Oral)   Resp 17   Ht 6' 2\" (1.88 m)   Wt 246 lb 7.6 oz (111.8 kg)   SpO2 99%   BMI 31.65 kg/m²       GEN alert, in no distress  HEENT normocephalic, anicteric sclera, EOMI, mucosa moist, no stridor  NECK supple, trachea midline  CHEST Rt Tunneled HD cath, pressure dressing.   RESP on RA, in no distress, clear to auscultation  CARDS RRR, S1, S2, no murmurs, chronic bilateral LE edema, radial pulse 2+, DP pulse  ABD +BS, soft nontender  MSK no cyanosis, no clubbing  SKIN chronic skin changes with chronic edema, warm, dry  NEURO alert, oriented x 3, no facial asymmetry, no dysarthria, moving spontaneously  PSYCH normal mood      Labs:   Recent Labs     10/18/22  0501 10/18/22  1845 10/19/22  0506 10/20/22  0538   WBC 9.1  --  13.5* 13.7*   HGB 8.4* 8.0* 7.8* 7.5*   HCT 24.9* 24.3* 23.2* 23.4*     --  206 212       Recent Labs     10/18/22  0501 10/19/22  0507 10/20/22  0538    141 137   K 3.5 3.4* 3.9  3.9    104 101   CO2 24 24 25   BUN 59* 60* 33*   CREATININE 9.9* 10.5* 7.9*   CALCIUM 8.2* 8.2* 8.5   PHOS 4.9  --   --        Recent Labs     10/20/22  0538   AST 12*   ALT 6*   BILITOT 0.5   ALKPHOS 97     No results for input(s): INR in the last 72 hours. No results for input(s): Donzella Rands in the last 72 hours. Urinalysis:      Lab Results   Component Value Date/Time    NITRU Negative 10/15/2022 09:27 PM    WBCUA 10-20 10/15/2022 09:27 PM    RBCUA 0-2 10/15/2022 09:27 PM    BLOODU SMALL 10/15/2022 09:27 PM    SPECGRAV 1.020 10/15/2022 09:27 PM    GLUCOSEU Negative 10/15/2022 09:27 PM       Radiology:  VL Extremity Venous Bilateral   Final Result      IR TUNNELED CVC PLACE WO SQ PORT/PUMP > 5 YEARS   Final Result   1. Satisfactory ultrasound and fluoroscopic-guided placement and positioning of tunneled  right internal jugular dialysis catheter   2. Catheter tip in satisfactory positioning of right atrium and is ready for use. 3. Conscious sedation without complication         XR CHEST PORTABLE   Final Result   1. Cardiomegaly. XR CHEST (2 VW)   Final Result      Gross cardiac enlargement. .      No acute traumatic consolidation. Assessment/Plan:     This is 70-year-old male with history of HTN, HLD, CKD V (not on dialysis), who presented with leg swelling and recent abnormal labs. Patient states that he began having bilateral leg swelling about 2 weeks. He states that he has also been having a dyspnea on exertion but denies any chest pain. He also states that he has recently gained about 20 to 30 pounds. patient also states that he was called due to very low hemoglobin on recent labs. He states that he has been having dark stools. Patient denies any lightheadedness/ dizziness, nausea/vomiting, abdominal pain, diarrhea/constipation, dysuria or hematuria. Patient states that he is compliant with his home BP medications. Patient has CKD V and has had multiple referrals to nephrologist but did not go to them. On arrival patient was hypertensive with a /104. Labs showed hemoglobin of 5.7, creatinine of 13.8, and BNP >70,000. CXR showed gross cardiac enlargement with no acute traumatic consolidation. Nephrology was contacted in the ED, recommended no acute indication for emergent dialysis and he was given 80mg IV lasix. He was also given 1g IV ceftriaxone for suspected upper GI bleed. Active Hospital Problems    Diagnosis Date Noted    Anemia [D64.9] 10/16/2022     Priority: Medium    Leg swelling [M79.89] 10/16/2022     Priority: Medium    Acute renal failure (ARF) ([facility] [address] [N17.9] 10/15/2022     Priority: Medium    ESRD (end stage renal disease) ([facility] [address] [N18.6] 10/15/2022     Priority: Medium    CKD (chronic kidney disease) [N18.9] 06/16/2017    Essential hypertension [I10] 12/14/2016     New presentation of ESRD  - Started HD run #1 10/16; run #2 on 10/17.  - s/p  tunneled RI HD Cath 10/17/22.  - Needs dialysis chair. Normocytic Anemia, related to CKD vs Upper GI bleed  - adm HgB 5.7 on 10/15/22. MCV 89.9.   Baseline HgB 12.8.  -S/p 3 units PRBCs w/ Hgb in the 8.  -Trend Hgb q12h today; can probably space to qd tomorrow if stable  - Continue protonix 40 mg PO BID.  - GI consulted, no acute intervention indicated currently; will re-enngage if

## 2022-10-20 NOTE — CARE COORDINATION
CM continues to follow for DC planning and needs. Patient received OP HD schedule and spoke with financial counselor with [employer] for financial clearance for OP HD set up. CM provided OP HD schedule to patient, he is being followed by GI and plan for EGD 10/21/22. First OP HD session is this Saturday 10.22.2022 at 0600. CM will continue to follow.     Thank you,  Brandy Luna RN,   4th Floor Progressive Care Unit  [phone]

## 2022-10-20 NOTE — PROGRESS NOTES
Pt is scheduled to have an egd tomorrow in Am. Pt is aware he is to be NPO after midnight tonight. Pt had zero c/o of pain or discomfort this shift. No reports of BM this shift and stated the last BM he had wasn't dark or bloody.

## 2022-10-20 NOTE — PROGRESS NOTES
Nephrology Consult Note        [facility]    [email or web address]       Phone: [phone]                                                  10/20/2022 9:18 AM     Patient: Briseyda Beckwith [id number]  [id number]  Date of Admit: 10/14/2022 LOS: 5 days        Interval History and Plan:  Seen patient at bedside with his wife  Normal coronary arteries on LHC  Patient comfortable on room air  No complaints. No SOB on room air  BP remain elevated but it is slowly improving  Good solute clearance  Mil otto        Sent request to [facility] team to arrange for education/plan for transition to home hemodialysis  HD tomorrow while here. Continue Carvedilol, Losartan, Nifedipine, Hydralazine   Start Torsemide  Monitor BP and will adjust medications further   Continue Calcitriol  Monitor Hb and transfuse as needed. Getting IV iron. Will start Epogen once BP is better controlled. Avoid nephrotoxins  Monitor input, output and weight  Monitor labs and vitals closely  Renally dose all medications  Awaiting placement. D/W patient and his wife. Thank you for allowing us to participate in this patient's care    In case of any question please call us at our 24 hour answering service [phone] or from 7 AM to 5 PM via [employer] or cell number    Joe Maldonado MD  [facility]ph[facility]  [employer] [address], [address]  Fax: [phone]  Office: [phone]       Assessment & Plan     Renal function:    Chronic Kidney Disease 5/ESRD  Cr 13    Started dialysis this admission.      Patient has insurance issues  Discussed home HD and peritoneal dialysis and patient want home HD        Electrolytes:  Lab Results   Component Value Date    CREATININE 7.9 ([facility]) 10/20/2022    BUN 33 (H) 10/20/2022     10/20/2022    K 3.9 10/20/2022    K 3.9 10/20/2022     10/20/2022    CO2 25 10/20/2022            # CKD- MBD:   Secondary hyperparathyroidism due to renal failure  Monitor Phos level while here. Lab Results   Component Value Date    .5 (H) 10/14/2022    CALCIUM 8.5 10/20/2022    PHOS 4.9 10/18/2022       Acid Base Status:   Stable      Volume status/BP:  BP elevated. Volume overloaded with chronic edema but comfortable on room air. Intake/Output Summary (Last 24 hours) at 10/20/2022 0918  Last data filed at 10/20/2022 0431  Gross per 24 hour   Intake 400 ml   Output 0 ml   Net 400 ml           Hematology:   CKD related anemia  Goal Hgb 10-11    Lab Results   Component Value Date    IRON 18 (L) 10/15/2022    TIBC 242 (L) 10/15/2022    FERRITIN 130.2 10/15/2022     Lab Results   Component Value Date    WBC 13.7 (H) 10/20/2022    HGB 7.5 (L) 10/20/2022    HCT 23.4 (L) 10/20/2022    MCV 90.8 10/20/2022     10/20/2022             Reason for Consult and Chief Complaint     Reason for consult: ESRD    Chief complaint:   Chief Complaint   Patient presents with    Leg Swelling     C/o on going leg swelling for a \"few weeks\" saw PCP and sent to get blood work today    Anemia     PCP called tonight and sent to ED d/t blood count low, patient states told \"5.0\" patient states sob, and dark stools for few days        History of Present Illness   Josh Cervantes is a 55 y.o. with PMH significant for CKD 5 was admitted with leg swelling, exertional SOB and low Hb. No chest pain. No SOB at rest. Patient also gaining weight. Patient reported some black stools. No focal weakness. Review of Systems   Positive in bold or unable to assess     GEN: Fever, chills, night sweats. HEENT: Changes in vision, sore throat, rhinorrhea   CVS: Chest pain, palpitations,swelling or edema in legs  Pulmonary: Cough, hemoptysis, SOB   GI: Nausea, vomiting, diarrhea, constipation, abdominal pain  : Bladder incontinence, dysuria,hematuria. MSK: Muscle or joint or bone pains  Skin: Rashes, ulcers, skin thickness  CNS: Headache, dizziness, confusion, focal weakness, seizure. Psych:  Anxiety, agitation, depression. Reviewed all 12 systems, negative except as above. Past Medical History     Past Medical History:   Diagnosis Date    Anxiety 3/28/2017    CKD (chronic kidney disease) 6/16/2017    Hyperlipidemia LDL goal <100 6/16/2017    Hypertension     Obesity (BMI 30.0-34.9) 6/16/2017         Past Surgical History     Past Surgical History:   Procedure Laterality Date    IR TUNNELED CATHETER PLACEMENT GREATER THAN 5 YEARS  10/17/2022    IR TUNNELED CATHETER PLACEMENT GREATER THAN 5 YEARS 10/17/2022 [facility]         Family History     History reviewed. No pertinent family history. Social History     Social History     Tobacco Use    Smoking status: Some Days     Years: 4.00     Types: Cigarettes    Smokeless tobacco: Never    Tobacco comments:      2 a week   Substance Use Topics    Alcohol use: No          Past medical, family, and social histories were reviewed as previously documented. Updates were made as necessary.       Inpatient Medications and Allergies       Scheduled Meds:   sodium chloride flush  5-40 mL IntraVENous 2 times per day    carvedilol  25 mg Oral BID WC    NIFEdipine  60 mg Oral Daily    iron sucrose  100 mg IntraVENous Q24H    hydrALAZINE  50 mg Oral 3 times per day    isosorbide mononitrate  60 mg Oral Daily    losartan  100 mg Oral Daily    heparin (porcine)  5,000 Units SubCUTAneous 3 times per day    sodium chloride flush  5-40 mL IntraVENous 2 times per day    pantoprazole  40 mg Oral BID AC    calcitRIOL  0.25 mcg Oral Daily       Allergies: No Known Allergies      Vital Signs     Vitals:    10/20/22 0913   BP: (!) 174/93   Pulse: 92   Resp: 17   Temp: 98.4 °F (36.9 °C)   SpO2: 99%         Intake/Output Summary (Last 24 hours) at 10/20/2022 1931  Last data filed at 10/20/2022 0431  Gross per 24 hour   Intake 400 ml   Output 0 ml   Net 400 ml           Physical Exam     General appearance: NAD  Head: Normocephalic, without obvious abnormality, atraumatic Mouth: Moist mucous membrane  Neck: Supple. Lungs: Good air entry bilaterally. No respiratory distress on RA.    Heart: S1, S2.  Abdomen: soft, non-tender non-distended  Extremities:Chronic appearing significant leg edema  Skin: No concerning rashes noted  Psych: good eye contact, normal affect  Neuro: AAO x 3, non focal.       Laboratory Data     Please see above     Diagnostic Studies   Pertinent images reviewed

## 2022-10-20 NOTE — PROGRESS NOTES
60 mg Oral Daily    sodium chloride flush  5-40 mL IntraVENous 2 times per day    carvedilol  25 mg Oral BID WC    NIFEdipine  60 mg Oral Daily    iron sucrose  100 mg IntraVENous Q24H    hydrALAZINE  50 mg Oral 3 times per day    isosorbide mononitrate  60 mg Oral Daily    losartan  100 mg Oral Daily    heparin (porcine)  5,000 Units SubCUTAneous 3 times per day    sodium chloride flush  5-40 mL IntraVENous 2 times per day    pantoprazole  40 mg Oral BID AC    calcitRIOL  0.25 mcg Oral Daily      sodium chloride      sodium chloride      sodium chloride      sodium chloride       heparin (porcine), sodium chloride flush, sodium chloride, acetaminophen, labetalol, sodium chloride, sodium chloride flush, sodium chloride, ondansetron **OR** ondansetron, polyethylene glycol, hydrOXYzine HCl, sodium chloride    Lab Data:  CBC:   Recent Labs     10/18/22  0501 10/18/22  1845 10/19/22  0506 10/20/22  0538   WBC 9.1  --  13.5* 13.7*   HGB 8.4* 8.0* 7.8* 7.5*   HCT 24.9* 24.3* 23.2* 23.4*   MCV 89.2  --  88.7 90.8     --  206 212     BMP:   Recent Labs     10/18/22  0501 10/19/22  0507 10/20/22  0538    141 137   K 3.5 3.4* 3.9  3.9    104 101   CO2 24 24 25   PHOS 4.9  --   --    BUN 59* 60* 33*   CREATININE 9.9* 10.5* 7.9*     Troponin:Troponin:   Lab Results   Component Value Date/Time    TROPONINI 0.06 10/15/2022 12:10 AM     LIVER PROFILE:   Recent Labs     10/20/22  0538   AST 12*   ALT 6*   BILITOT 0.5   ALKPHOS 97     PT/INR: No results for input(s): PROTIME, INR in the last 72 hours. APTT: No results for input(s): APTT in the last 72 hours. BNP:  No results for input(s): BNP in the last 72 hours. IMAGING: as noted  Cath as noted.   Assessment:  Patient Active Problem List    Diagnosis Date Noted    Anemia 10/16/2022    Leg swelling 10/16/2022    Acute renal failure (ARF) ([address] 10/15/2022    ESRD (end stage renal disease) ([address] 10/15/2022    Poor compliance 12/07/2020    Elevated glucose 12/07/2020    Hyperlipidemia LDL goal <100 06/16/2017    CKD (chronic kidney disease) 06/16/2017    Obesity (BMI 30.0-34.9) 06/16/2017    Anxiety 03/28/2017    Essential hypertension 12/14/2016       Plan:   Continue current medications. Core Measures:  Discharge instructions:   LVEF documented:   ACEI for LV dysfunction:   Cardiac status is stable. No coronary disease but there is cardiomyopathy. I think the patient may benefit from an additional blood pressure medication. Still getting hemodialysis and may be chronically permanent. Will probably need an Arni and I see after discharge. To follow-up in the office soon. We will sign off. Thank you. Thanks for allowing me  the opportunity to participate in the evaluation and care of your patient.  If there are questions please call me [phone]    This note was likely completed using voice recognition technology and may contain unintended grammatical, phraseology and/or punctuation  errors      Dayami Adams MD ,[facility]  10/20/2022 3:17 PM

## 2022-10-21 VITALS
RESPIRATION RATE: 18 BRPM | DIASTOLIC BLOOD PRESSURE: 81 MMHG | BODY MASS INDEX: 31.04 KG/M2 | WEIGHT: 241.84 LBS | TEMPERATURE: 98.8 F | HEART RATE: 84 BPM | HEIGHT: 74 IN | SYSTOLIC BLOOD PRESSURE: 156 MMHG | OXYGEN SATURATION: 94 %

## 2022-10-21 LAB
A/G RATIO: 1 (ref 1.1–2.2)
ALBUMIN SERPL-MCNC: 3.1 G/DL (ref 3.4–5)
ALP BLD-CCNC: 81 U/L (ref 40–129)
ALT SERPL-CCNC: 7 U/L (ref 10–40)
ANION GAP SERPL CALCULATED.3IONS-SCNC: 13 MMOL/L (ref 3–16)
AST SERPL-CCNC: 12 U/L (ref 15–37)
BASOPHILS ABSOLUTE: 0.2 K/UL (ref 0–0.2)
BASOPHILS RELATIVE PERCENT: 1.4 %
BILIRUB SERPL-MCNC: 0.3 MG/DL (ref 0–1)
BUN BLDV-MCNC: 40 MG/DL (ref 7–20)
CALCIUM SERPL-MCNC: 8.3 MG/DL (ref 8.3–10.6)
CHLORIDE BLD-SCNC: 99 MMOL/L (ref 99–110)
CO2: 22 MMOL/L (ref 21–32)
CREAT SERPL-MCNC: 9.5 MG/DL (ref 0.9–1.3)
EOSINOPHILS ABSOLUTE: 0.3 K/UL (ref 0–0.6)
EOSINOPHILS RELATIVE PERCENT: 2.8 %
GFR SERPL CREATININE-BSD FRML MDRD: 6 ML/MIN/{1.73_M2}
GLUCOSE BLD-MCNC: 108 MG/DL (ref 70–99)
HCT VFR BLD CALC: 21.6 % (ref 40.5–52.5)
HEMOGLOBIN: 7.3 G/DL (ref 13.5–17.5)
LYMPHOCYTES ABSOLUTE: 1.5 K/UL (ref 1–5.1)
LYMPHOCYTES RELATIVE PERCENT: 12.2 %
MAGNESIUM: 1.9 MG/DL (ref 1.8–2.4)
MCH RBC QN AUTO: 30.6 PG (ref 26–34)
MCHC RBC AUTO-ENTMCNC: 34 G/DL (ref 31–36)
MCV RBC AUTO: 89.9 FL (ref 80–100)
MONOCYTES ABSOLUTE: 1.2 K/UL (ref 0–1.3)
MONOCYTES RELATIVE PERCENT: 10.2 %
NEUTROPHILS ABSOLUTE: 8.9 K/UL (ref 1.7–7.7)
NEUTROPHILS RELATIVE PERCENT: 73.4 %
PDW BLD-RTO: 15.2 % (ref 12.4–15.4)
PLATELET # BLD: 205 K/UL (ref 135–450)
PMV BLD AUTO: 8.5 FL (ref 5–10.5)
POTASSIUM REFLEX MAGNESIUM: 4.2 MMOL/L (ref 3.5–5.1)
RBC # BLD: 2.4 M/UL (ref 4.2–5.9)
SODIUM BLD-SCNC: 134 MMOL/L (ref 136–145)
TOTAL PROTEIN: 6.2 G/DL (ref 6.4–8.2)
WBC # BLD: 12.1 K/UL (ref 4–11)

## 2022-10-21 PROCEDURE — 90935 HEMODIALYSIS ONE EVALUATION: CPT

## 2022-10-21 PROCEDURE — 36415 COLL VENOUS BLD VENIPUNCTURE: CPT

## 2022-10-21 PROCEDURE — 6360000002 HC RX W HCPCS: Performed by: STUDENT IN AN ORGANIZED HEALTH CARE EDUCATION/TRAINING PROGRAM

## 2022-10-21 PROCEDURE — 83735 ASSAY OF MAGNESIUM: CPT

## 2022-10-21 PROCEDURE — 6370000000 HC RX 637 (ALT 250 FOR IP): Performed by: STUDENT IN AN ORGANIZED HEALTH CARE EDUCATION/TRAINING PROGRAM

## 2022-10-21 PROCEDURE — 85025 COMPLETE CBC W/AUTO DIFF WBC: CPT

## 2022-10-21 PROCEDURE — 6370000000 HC RX 637 (ALT 250 FOR IP): Performed by: INTERNAL MEDICINE

## 2022-10-21 PROCEDURE — 80053 COMPREHEN METABOLIC PANEL: CPT

## 2022-10-21 RX ORDER — FERROUS SULFATE 325(65) MG
325 TABLET ORAL
Qty: 90 TABLET | Refills: 1 | Status: SHIPPED | OUTPATIENT
Start: 2022-10-21 | End: 2022-11-09

## 2022-10-21 RX ORDER — CARVEDILOL 25 MG/1
25 TABLET ORAL 2 TIMES DAILY WITH MEALS
Qty: 60 TABLET | Refills: 3 | Status: SHIPPED | OUTPATIENT
Start: 2022-10-21

## 2022-10-21 RX ORDER — PANTOPRAZOLE SODIUM 40 MG/1
40 TABLET, DELAYED RELEASE ORAL
Qty: 30 TABLET | Refills: 3 | Status: SHIPPED | OUTPATIENT
Start: 2022-10-21

## 2022-10-21 RX ORDER — LOSARTAN POTASSIUM 100 MG/1
100 TABLET ORAL DAILY
Qty: 30 TABLET | Refills: 3 | Status: SHIPPED | OUTPATIENT
Start: 2022-10-22

## 2022-10-21 RX ORDER — ISOSORBIDE MONONITRATE 60 MG/1
60 TABLET, EXTENDED RELEASE ORAL DAILY
Qty: 30 TABLET | Refills: 3 | Status: SHIPPED | OUTPATIENT
Start: 2022-10-22

## 2022-10-21 RX ORDER — CALCITRIOL 0.25 UG/1
0.25 CAPSULE, LIQUID FILLED ORAL DAILY
Qty: 30 CAPSULE | Refills: 3 | Status: SHIPPED | OUTPATIENT
Start: 2022-10-22 | End: 2022-11-09

## 2022-10-21 RX ORDER — NIFEDIPINE 60 MG/1
60 TABLET, EXTENDED RELEASE ORAL DAILY
Qty: 30 TABLET | Refills: 3 | Status: SHIPPED | OUTPATIENT
Start: 2022-10-22

## 2022-10-21 RX ADMIN — NIFEDIPINE 60 MG: 60 TABLET, FILM COATED, EXTENDED RELEASE ORAL at 08:13

## 2022-10-21 RX ADMIN — CARVEDILOL 25 MG: 25 TABLET, FILM COATED ORAL at 08:13

## 2022-10-21 RX ADMIN — ISOSORBIDE MONONITRATE 60 MG: 60 TABLET, EXTENDED RELEASE ORAL at 08:13

## 2022-10-21 RX ADMIN — HEPARIN SODIUM 5000 UNITS: 5000 INJECTION INTRAVENOUS; SUBCUTANEOUS at 06:43

## 2022-10-21 RX ADMIN — LOSARTAN POTASSIUM 100 MG: 100 TABLET, FILM COATED ORAL at 08:13

## 2022-10-21 RX ADMIN — CALCITRIOL CAPSULES 0.25 MCG 0.25 MCG: 0.25 CAPSULE ORAL at 08:13

## 2022-10-21 RX ADMIN — TORSEMIDE 60 MG: 20 TABLET ORAL at 08:13

## 2022-10-21 ASSESSMENT — PAIN SCALES - GENERAL
PAINLEVEL_OUTOF10: 0

## 2022-10-21 NOTE — DISCHARGE SUMMARY
Hospital Medicine Discharge Summary    Patient ID: Maria De Jesus Day      Patient's PCP: Kyleigh Meek MD    Admit Date: 10/14/2022     Discharge Date: 10/21/2022      Admitting Provider: Maryanne Calles MD     Discharge Provider: Belem Carl MD     Discharge Diagnoses: Active Hospital Problems    Diagnosis     Anemia [D64.9]      Priority: Medium    Leg swelling [M79.89]      Priority: Medium    Acute renal failure (ARF) ([facility]) [N17.9]      Priority: Medium    ESRD (end stage renal disease) ([address] [N18.6]      Priority: Medium    CKD (chronic kidney disease) [N18.9]     Essential hypertension [I10]        The patient was seen and examined on day of discharge and this discharge summary is in conjunction with any daily progress note from day of discharge. Hospital Course: Patient is a 77-year-old male with PMHx of CKD V (not on dialysis), HTN and HLD who presented complaining of leg swelling and recent abnormal labs. Patient states that he began having bilateral leg swelling about 2 weeks. He was found to have PRESLEY on CKD which requiring start HD. He was also found to have anemia. Has been treated with IV iron. There were also concerns for GI bleed. GI consulted and planning for EGD on 10/21/22 however, patient wants to be discharged and would like to follow up as outpatient. No signs of bleeding on discharge. He was also found to have new acute HFrEF with EF 40-45%. Cardiology consulted. Coronary angiogram  and LHC on 10/19 which showed non ischemic cardiomyopathy. His BP medication has been adjusted during hospitalization by nephrology. He was discharged on Carvedilol 25 mg daily, Imdur 60 mg daily, losartan 100mg daily, Nifedipine 60mg daily and torsemide 60 mg daily. Upon discharge, he was hemodynamically stable. New presentation of ESRD  - Started HD run #1 10/16; run #2 on 10/17.  - s/p  tunneled RIJ HD Cath 10/17/22.   - Home HD arranged by nephrology     Normocytic Anemia, related to CKD vs Upper GI bleed  - adm HgB 5.7 on 10/15/22. MCV 89.9. Baseline HgB 12.8.  -S/p 3 units PRBCs w/ Hgb in the 8.  -Trend Hgb q12h today; can probably space to qd tomorrow if stable  - Continue protonix 40 mg PO BID.  - GI consulted, no acute intervention indicated currently; will re-enngage if clinical changes  -Resume diet  On iv iron   10/20  Hb today 7.5 despite being on iv iron and will continue to have IV iron during dialysis. Concern about possible GI bleeding  Consult GI, planning for EGD today but patient refused and wants to go home and follow up as outpatient. No signs of bleeding. GI ok with the plan. New Acute HFrEF  New CM  Abnormal EKG  Patient presented with bilateral leg swelling, weight gain, and shortness of breath on exertion. On presentation, BNP >70,000. CXR showed gross cardiac enlargement with no acute traumatic consolidation.  -Echocardiogram 10/15/22 showed normal LV size, mild concentric LVH, mild-mod decreased LVEF 4045%. Global hypokinesis. Grade III diastolic dysfunction with elevated LV filling pressures. LA and RA dilated. Mild valvular disease.  -Cardiology consulted. Plan for coronary angiogram on 10/19. .  - HD for volume control.  - Strict I's and O, Daily weights  -Underwent LHC 10/19 with finding of non ischemic cardiomyopathy      Severe Hypertension  - per renal, d/dacia home BP medications  -Patient's BP medication has been adjusted by renal and discharged on Carvedilol 25 mg daily, Imdur 60 mg daily, losartan 100mg daily, Nifedipine 60mg daily and torsemide 60 mg daily. Physical Exam Performed:     BP (!) 156/81   Pulse 84   Temp 98.8 °F (37.1 °C) (Oral)   Resp 18   Ht 6' 2\" (1.88 m)   Wt 241 lb 13.5 oz (109.7 kg)   SpO2 94%   BMI 31.05 kg/m²       General appearance:  No apparent distress, appears stated age and cooperative. HEENT:  Normal cephalic, atraumatic without obvious deformity. Pupils equal, round, and reactive to light.   Extra ocular muscles intact. Conjunctivae/corneas clear. Neck: Supple, with full range of motion. No jugular venous distention. Trachea midline. Respiratory:  Normal respiratory effort. Clear to auscultation, bilaterally without Rales/Wheezes/Rhonchi. Cardiovascular:  Regular rate and rhythm with normal S1/S2 without murmurs, rubs or gallops. Abdomen: Soft, non-tender, non-distended with normal bowel sounds. Musculoskeletal:  No clubbing, cyanosis or edema bilaterally. Full range of motion without deformity. Skin: Skin color, texture, turgor normal.  No rashes or lesions. Neurologic:  Neurovascularly intact without any focal sensory/motor deficits. Cranial nerves: II-XII intact, grossly non-focal.  Psychiatric:  Alert and oriented, thought content appropriate, normal insight  Capillary Refill: Brisk,< 3 seconds   Peripheral Pulses: +2 palpable, equal bilaterally       Labs: For convenience and continuity at follow-up the following most recent labs are provided:      CBC:    Lab Results   Component Value Date/Time    WBC 12.1 10/21/2022 04:37 AM    HGB 7.3 10/21/2022 04:37 AM    HCT 21.6 10/21/2022 04:37 AM     10/21/2022 04:37 AM       Renal:    Lab Results   Component Value Date/Time     10/21/2022 04:37 AM    K 4.2 10/21/2022 04:37 AM    CL 99 10/21/2022 04:37 AM    CO2 22 10/21/2022 04:37 AM    BUN 40 10/21/2022 04:37 AM    CREATININE 9.5 10/21/2022 04:37 AM    CALCIUM 8.3 10/21/2022 04:37 AM    PHOS 4.9 10/18/2022 05:01 AM         Significant Diagnostic Studies    Radiology:   VL Extremity Venous Bilateral   Final Result      IR TUNNELED CVC PLACE WO SQ PORT/PUMP > 5 YEARS   Final Result   1. Satisfactory ultrasound and fluoroscopic-guided placement and positioning of tunneled  right internal jugular dialysis catheter   2. Catheter tip in satisfactory positioning of right atrium and is ready for use. 3. Conscious sedation without complication         XR CHEST PORTABLE   Final Result   1. Cardiomegaly. XR CHEST (2 VW)   Final Result      Gross cardiac enlargement. .      No acute traumatic consolidation. Consults:     IP CONSULT TO NEPHROLOGY  IP CONSULT TO HOSPITALIST  IP CONSULT TO CRITICAL CARE  IP CONSULT TO GI  IP CONSULT TO PHARMACY  IP CONSULT TO GENERAL SURGERY  IP CONSULT TO INTERVENTIONAL RADIOLOGY  IP CONSULT TO CARDIOLOGY  IP CONSULT TO GI  IP CONSULT TO PHARMACY    Disposition:  home     Condition at Discharge: Stable    Discharge Instructions/Follow-up:  PCP, nephrology, cardiology, GI    Code Status:  Full Code     Activity: activity as tolerated    Diet: renal diet      Discharge Medications:     Discharge Medication List as of 10/21/2022  3:01 PM             Details   losartan (COZAAR) 100 MG tablet Take 1 tablet by mouth daily, Disp-30 tablet, R-3Normal      carvedilol (COREG) 25 MG tablet Take 1 tablet by mouth 2 times daily (with meals), Disp-60 tablet, R-3Normal      NIFEdipine (PROCARDIA XL) 60 MG extended release tablet Take 1 tablet by mouth daily, Disp-30 tablet, R-3Normal      torsemide 60 MG TABS Take 60 mg by mouth daily, Disp-30 tablet, R-3Normal      calcitRIOL (ROCALTROL) 0.25 MCG capsule Take 1 capsule by mouth daily, Disp-30 capsule, R-3Normal      pantoprazole (PROTONIX) 40 MG tablet Take 1 tablet by mouth 2 times daily (before meals), Disp-30 tablet, R-3Normal      isosorbide mononitrate (IMDUR) 60 MG extended release tablet Take 1 tablet by mouth daily, Disp-30 tablet, R-3Normal      ferrous sulfate (IRON 325) 325 (65 Fe) MG tablet Take 1 tablet by mouth daily (with breakfast), Disp-90 tablet, R-1Normal             Time Spent on discharge is more than 30 minutes in the examination, evaluation, counseling and review of medications and discharge plan. Signed: Esther Hernandez MD   10/21/2022      Thank you Krystal Jay MD for the opportunity to be involved in this patient's care.  If you have any questions or concerns, please feel free to contact me at [phone].

## 2022-10-21 NOTE — PLAN OF CARE
Problem: Discharge Planning  Goal: Discharge to home or other facility with appropriate resources  Outcome: Completed  Flowsheets (Taken 10/21/2022 0807)  Discharge to home or other facility with appropriate resources:   Identify barriers to discharge with patient and caregiver   Arrange for needed discharge resources and transportation as appropriate   Identify discharge learning needs (meds, wound care, etc)   Arrange for interpreters to assist at discharge as needed   Refer to discharge planning if patient needs post-hospital services based on physician order or complex needs related to functional status, cognitive ability or social support system     Problem: Safety - Adult  Goal: Free from fall injury  10/21/2022 1454 by Clyde Patel RN  Outcome: Completed  10/21/2022 0056 by Gab Carlos  Outcome: Progressing  Flowsheets (Taken 10/21/2022 0056)  Free From Fall Injury: Instruct family/caregiver on patient safety  Note: Pt is UAL and ambulates independently. Problem: Skin/Tissue Integrity  Goal: Absence of new skin breakdown  Description: 1. Monitor for areas of redness and/or skin breakdown  2. Assess vascular access sites hourly  3. Every 4-6 hours minimum:  Change oxygen saturation probe site  4. Every 4-6 hours:  If on nasal continuous positive airway pressure, respiratory therapy assess nares and determine need for appliance change or resting period. 10/21/2022 1454 by Clyde Patel RN  Outcome: Completed  10/21/2022 0056 by Gab Carlos  Outcome: Progressing  Note: Pt shows no new skin breakdown.      Problem: ABCDS Injury Assessment  Goal: Absence of physical injury  10/21/2022 1454 by Clyde Patel RN  Outcome: Completed  10/21/2022 0056 by Gab Carlos  Outcome: Progressing  Flowsheets (Taken 10/21/2022 0056)  Absence of Physical Injury: Implement safety measures based on patient assessment     Problem: Cardiovascular - Adult  Goal: Maintains optimal cardiac output and hemodynamic stability  10/21/2022 1454 by Rony Batres RN  Outcome: Completed  Flowsheets (Taken 10/21/2022 0807)  Maintains optimal cardiac output and hemodynamic stability:   Monitor blood pressure and heart rate   Monitor urine output and notify Licensed Independent Practitioner for values outside of normal range   Administer vasoactive medications as ordered   Administer fluid and/or volume expanders as ordered   Assess for signs of decreased cardiac output   For PPHN infants, administer sedation as ordered and minimize all controllable stressors. 10/21/2022 0056 by Antonio Marcos  Outcome: Progressing  Flowsheets (Taken 10/21/2022 0056)  Maintains optimal cardiac output and hemodynamic stability:   Monitor blood pressure and heart rate   Monitor urine output and notify Licensed Independent Practitioner for values outside of normal range   Assess for signs of decreased cardiac output  Note: Pt is on continuous telemetry and heart rhythm is NSR.   Goal: Absence of cardiac dysrhythmias or at baseline  Outcome: Completed  Flowsheets (Taken 10/21/2022 0807)  Absence of cardiac dysrhythmias or at baseline:   Monitor cardiac rate and rhythm   Assess for signs of decreased cardiac output   Administer antiarrhythmia medication and electrolyte replacement as ordered     Problem: Respiratory - Adult  Goal: Achieves optimal ventilation and oxygenation  10/21/2022 1454 by Rony Batres RN  Outcome: Completed  10/21/2022 0056 by Antonio Marcos  Outcome: Progressing  Flowsheets (Taken 10/21/2022 0056)  Achieves optimal ventilation and oxygenation:   Assess for changes in respiratory status   Position to facilitate oxygenation and minimize respiratory effort   Respiratory therapy support as indicated   Oxygen supplementation based on oxygen saturation or arterial blood gases   Encourage broncho-pulmonary hygiene including cough, deep breathe, incentive spirometry   Assess and instruct to report shortness of breath or any respiratory difficulty   Assess for changes in mentation and behavior  Note: Pt remains on RA and SpO2 has been WNL.      Problem: Metabolic/Fluid and Electrolytes - Adult  Goal: Electrolytes maintained within normal limits  Outcome: Completed  Flowsheets (Taken 10/21/2022 0807)  Electrolytes maintained within normal limits:   Monitor labs and assess patient for signs and symptoms of electrolyte imbalances   Administer electrolyte replacement as ordered   Monitor response to electrolyte replacements, including repeat lab results as appropriate   Fluid restriction as ordered   Instruct patient on fluid and nutrition restrictions as appropriate  Goal: Hemodynamic stability and optimal renal function maintained  10/21/2022 1454 by Carissa Alatorre RN  Outcome: Completed  Flowsheets (Taken 10/21/2022 0807)  Hemodynamic stability and optimal renal function maintained:   Monitor labs and assess for signs and symptoms of volume excess or deficit   Monitor intake, output and patient weight   Monitor urine specific gravity, serum osmolarity and serum sodium as indicated or ordered   Monitor response to interventions for patient's volume status, including labs, urine output, blood pressure (other measures as available)   Encourage oral intake as appropriate   Instruct patient on fluid and nutrition restrictions as appropriate  10/21/2022 0056 by Elena Han  Outcome: Progressing  Flowsheets (Taken 10/21/2022 0056)  Hemodynamic stability and optimal renal function maintained:   Monitor labs and assess for signs and symptoms of volume excess or deficit   Monitor intake, output and patient weight   Monitor urine specific gravity, serum osmolarity and serum sodium as indicated or ordered   Monitor response to interventions for patient's volume status, including labs, urine output, blood pressure (other measures as available)     Problem: Pain  Goal: Verbalizes/displays adequate comfort level or baseline comfort level  10/21/2022 1454 by Maximiliano Damon RN  Outcome: Completed  Flowsheets  Taken 10/21/2022 1227  Verbalizes/displays adequate comfort level or baseline comfort level:   Encourage patient to monitor pain and request assistance   Assess pain using appropriate pain scale   Administer analgesics based on type and severity of pain and evaluate response   Implement non-pharmacological measures as appropriate and evaluate response   Consider cultural and social influences on pain and pain management   Notify Licensed Independent Practitioner if interventions unsuccessful or patient reports new pain  Taken 10/21/2022 0750  Verbalizes/displays adequate comfort level or baseline comfort level:   Encourage patient to monitor pain and request assistance   Assess pain using appropriate pain scale   Administer analgesics based on type and severity of pain and evaluate response   Implement non-pharmacological measures as appropriate and evaluate response   Consider cultural and social influences on pain and pain management  10/21/2022 0056 by Erik Jaquez  Outcome: Progressing  Flowsheets (Taken 10/21/2022 0056)  Verbalizes/displays adequate comfort level or baseline comfort level:   Encourage patient to monitor pain and request assistance   Assess pain using appropriate pain scale   Implement non-pharmacological measures as appropriate and evaluate response   Administer analgesics based on type and severity of pain and evaluate response   Notify Licensed Independent Practitioner if interventions unsuccessful or patient reports new pain  Note: Pt has denied pain during shift.

## 2022-10-21 NOTE — CARE COORDINATION
Case Management Assessment            Discharge Note                    Date / Time of Note: 10/21/2022 2:04 PM                  Discharge Note Completed by: Jericho Kam RN    Patient Name: Maria De Jesus Day   YOB: 1975  Diagnosis: Shortness of breath [R06.02]  Leg swelling [M79.89]  ESRD (end stage renal disease) ([facility] [address] [N18.6]  CKD (chronic kidney disease) [N18.9]  Acute renal failure (ARF) ([facility] [address] [N17.9]  Acute renal failure, unspecified acute renal failure type ([facility] [address] [N17.9]  Anemia, unspecified type [D64.9]   Date / Time: 10/14/2022 11:26 PM    Current PCP: Kyleigh Meek MD  Clinic patient: No    Hospitalization in the last 30 days: No    Advance Directives:  Code Status: Full Code  [place] DNR form completed and on chart: No, Not Indicated    Financial:  Payor: /      Pharmacy:    [facility] [address]  Phone: [phone] Fax: [phone]    [id number] [place] [address]. Elida Crowley [phone] - F [phone]  [address] [place] [address]  Phone: [phone] Fax: [phone]      Assistance purchasing medications?:    Assistance provided by Case Management: None at this time    Does patient want to participate in local refill/ meds to beds program?:      Meds To Beds General Rules:  1. Can ONLY be done Monday- Friday between 8:30am-5pm  2. Prescription(s) must be in pharmacy by 3pm to be filled same day  3. Copy of patient's insurance/ prescription drug card and patient face sheet must be sent along with the prescription(s)  4. Cost of Rx cannot be added to hospital bill. If financial assistance is needed, please contact unit  or ;  or  CANNOT provide pharmacy voucher for patients co-pays  5.  Patients can then  the prescription on their way out of the hospital at discharge, or pharmacy can deliver to the bedside if staff is available. (payment due at time of pick-up or delivery - cash, check, or card accepted)     Able to afford home medications/ co-pay costs: Yes    ADLS:  Current PT AM-PAC Score:   /24  Current OT AM-PAC Score:   /24      DISCHARGE Disposition: Home- No Services Needed    LOC at discharge: Not Applicable  RACHEL Completed: No, Not Indicated    Notification completed in HENS/PAS?:  Not Applicable    IMM Completed:   Not Indicated    Transportation:  Transportation PLAN for discharge: family   Mode of Transport: Not Applicable  Reason for medical transport: Not Applicable  Name of [address] [place] [address],[address]: Not Applicable  Time of Transport:     Transport form completed: No, Not Indicated    Home Care:  [address] ordered at discharge: No, Not [address]: Not Applicable  Orders faxed: No    Durable Medical Equipment:  DME Provider: none  Equipment obtained during hospitalization:     Home Oxygen and Respiratory Equipment:  Oxygen needed at discharge?: No, Not [address]: Not Applicable  Portable tank available for discharge?: No, Not Indicated    Dialysis:  Dialysis patient: Yes    Dialysis Center:  [facility]  Address: [address] [place] [address]  Phone: [phone]    Hospice Services:  Location: Not Applicable  Agency: Not Applicable    Consents signed: No, Not Indicated    Referrals made at [facility] for outpatient continued care:  Not Applicable    Additional CM Notes: Patient to return home with spouse today. Patient has been set up for OP HD through [employer] at [facility], he has schedule and transport to get to HD at [place]. Patient will start with first treatment 10/22/22 at 0600.     The Plan for Transition of Care is related to the following treatment goals of Shortness of breath [R06.02]  Leg swelling [M79.89]  ESRD (end stage renal disease) ([facility] [address] [N18.6]  CKD (chronic kidney disease) [N18.9]  Acute renal failure (ARF) ([facility] [address] [N17.9]  Acute renal failure, unspecified acute renal failure type ([facility] [address] [N17.9]  Anemia, unspecified type [D64.9]    The Patient and/or patient representative María Breen and his family were provided with a choice of provider and agrees with the discharge plan Yes    Freedom of choice list was provided with basic dialogue that supports the patient's individualized plan of care/goals and shares the quality data associated with the providers.  Yes    Care Transitions patient: No    Lucas Wolff RN  [facility] [employer]  Case Management Department  Ph: [phone]  Fax: [phone]

## 2022-10-21 NOTE — PLAN OF CARE
Problem: Safety - Adult  Goal: Free from fall injury  10/21/2022 0056 by Diana Braun  Outcome: Progressing  Flowsheets (Taken 10/21/2022 0056)  Free From Fall Injury: Instruct family/caregiver on patient safety  Note: Pt is UAL and ambulates independently. Problem: Skin/Tissue Integrity  Goal: Absence of new skin breakdown  Description: 1. Monitor for areas of redness and/or skin breakdown  10/21/2022 0056 by Diana Braun  Outcome: Progressing  Note: Pt shows no new skin breakdown. Problem: ABCDS Injury Assessment  Goal: Absence of physical injury  Outcome: Progressing  Flowsheets (Taken 10/21/2022 0056)  Absence of Physical Injury: Implement safety measures based on patient assessment     Problem: Cardiovascular - Adult  Goal: Maintains optimal cardiac output and hemodynamic stability  Outcome: Progressing  Flowsheets (Taken 10/21/2022 0056)  Maintains optimal cardiac output and hemodynamic stability:   Monitor blood pressure and heart rate   Monitor urine output and notify Licensed Independent Practitioner for values outside of normal range   Assess for signs of decreased cardiac output  Note: Pt is on continuous telemetry and heart rhythm is NSR. Problem: Respiratory - Adult  Goal: Achieves optimal ventilation and oxygenation  Outcome: Progressing  Flowsheets (Taken 10/21/2022 0056)  Achieves optimal ventilation and oxygenation:   Assess for changes in respiratory status   Position to facilitate oxygenation and minimize respiratory effort   Respiratory therapy support as indicated   Oxygen supplementation based on oxygen saturation or arterial blood gases   Encourage broncho-pulmonary hygiene including cough, deep breathe, incentive spirometry   Assess and instruct to report shortness of breath or any respiratory difficulty   Assess for changes in mentation and behavior  Note: Pt remains on RA and SpO2 has been WNL.      Problem: Metabolic/Fluid and Electrolytes - Adult  Goal: Hemodynamic stability and optimal renal function maintained  Outcome: Progressing  Flowsheets (Taken 10/21/2022 0056)  Hemodynamic stability and optimal renal function maintained:   Monitor labs and assess for signs and symptoms of volume excess or deficit   Monitor intake, output and patient weight   Monitor urine specific gravity, serum osmolarity and serum sodium as indicated or ordered   Monitor response to interventions for patient's volume status, including labs, urine output, blood pressure (other measures as available)     Problem: Pain  Goal: Verbalizes/displays adequate comfort level or baseline comfort level  Outcome: Progressing  Flowsheets (Taken 10/21/2022 0056)  Verbalizes/displays adequate comfort level or baseline comfort level:   Encourage patient to monitor pain and request assistance   Assess pain using appropriate pain scale   Implement non-pharmacological measures as appropriate and evaluate response   Administer analgesics based on type and severity of pain and evaluate response   Notify Licensed Independent Practitioner if interventions unsuccessful or patient reports new pain  Note: Pt has denied pain during shift.

## 2022-10-21 NOTE — CONSULTS
Clinical Pharmacy Progress Note   Heart Failure Medication Review    Mr. Meme Cali is a 55 y.o. male with a PMHx significant for HTN, HLD, CKD V (now on HD); admitted for new onset ESRD and HFrEF. Pharmacy has been asked to review heart failure medications in anticipation of discharge by Gayathri Long MD.    Recent Labs     10/19/22  9597 10/19/22  0507 10/20/22  0538 10/21/22  0437   CREATININE  --  10.5* 7.9* 9.5*   BUN  --  60* 33* 40*   WBC 13.5*  --  13.7* 12.1*       Estimated Creatinine Clearance: 13 mL/min (A) (based on SCr of 9.5 mg/dL [facility])). - ESRD on HD    If EF ?  40%, is an ACE/ARB/ARNI ordered or contraindication documented? Ordered - Patient on losartan. If EF ?  40%, is an evidence-based beta blocker ordered or contraindication documented? Ordered - Patient on Carvedilol  If EF ? 35%, is an aldosterone receptor antagonist ordered or contraindication documented? Contraindicated - ESRD on HD  If EF ? 40% (with or without DM2), is a SGLT2 inhibitor ordered or contraindicated documented? Contraindicated - ESRD on HD    Corrections to discharge medications include:   None at this time    Please call with questions.   Jany Manzano PharmD  Main Pharmacy: [id number]  10/21/2022 4:20 PM

## 2022-10-21 NOTE — PROGRESS NOTES
Nephrology Consult Note        [facility]    [email or web address]       Phone: [phone]                                                  10/21/2022 2:26 PM     Patient: Renae Pacheco [id number]  [id number]  Date of Admit: 10/14/2022 LOS: 6 days        Interval History and Plan:  Seen patient at bedside with his wife  Normal coronary arteries on LHC  Patient comfortable on room air  No complaints. No SOB on room air  BP fluctuating 130- 190 range. Last one 156/81  Good solute clearance  Lytes stabl      HD 2 hour done today and will have routine dialysis with UF tomorrow per TTS schedule  Continue Carvedilol, Losartan, Nifedipine, Torsemide  Monitor BP and will adjust medications further as outpatient. Patient is volume overloaded and with UF BP should improve. Continue Calcitriol  Monitor Hb and transfuse as needed. IV iron + Epogen as outpatient per protocol  Renally dose all medications  D/C today  Transition to home HD as outpatient. D/W patient and his wife. D/W team at bedside      Thank you for allowing us to participate in this patient's care    In case of any question please call us at our 24 hour answering service [phone] or from 7 AM to 5 PM via [address] [place] [address] or cell number    Alec Rehman MD  [facility]  [employer] [address], [address]  Fax: [phone]  Office: [phone]       Assessment & Plan     Renal function:    Chronic Kidney Disease 5/ESRD  Cr 13    Started dialysis this admission. Patient has insurance issues  Discussed home HD and peritoneal dialysis and patient want home HD        Electrolytes:  Lab Results   Component Value Date    CREATININE 9.5 ([facility]) 10/21/2022    BUN 40 (H) 10/21/2022     (L) 10/21/2022    K 4.2 10/21/2022    CL 99 10/21/2022    CO2 22 10/21/2022            # CKD- MBD:   Secondary hyperparathyroidism due to renal failure  Monitor Phos level while here.    Lab Results   Component Value Date    PTH 796.5 (H) 10/14/2022    CALCIUM 8.3 10/21/2022    PHOS 4.9 10/18/2022       Acid Base Status:   Stable      Volume status/BP:  BP elevated > improving. Volume overloaded with chronic edema but comfortable on room air > No urgency and need gradual volume removal as outpatient. Intake/Output Summary (Last 24 hours) at 10/21/2022 1426  Last data filed at 10/21/2022 1030  Gross per 24 hour   Intake 670 ml   Output 2875 ml   Net -2205 ml           Hematology:   CKD related anemia  Goal Hgb 10-11    Lab Results   Component Value Date    IRON 18 (L) 10/15/2022    TIBC 242 (L) 10/15/2022    FERRITIN 130.2 10/15/2022     Lab Results   Component Value Date    WBC 12.1 (H) 10/21/2022    HGB 7.3 (L) 10/21/2022    HCT 21.6 (L) 10/21/2022    MCV 89.9 10/21/2022     10/21/2022             Reason for Consult and Chief Complaint     Reason for consult: ESRD    Chief complaint:   Chief Complaint   Patient presents with    Leg Swelling     C/o on going leg swelling for a \"few weeks\" saw PCP and sent to get blood work today    Anemia     PCP called tonight and sent to ED d/t blood count low, patient states told \"5.0\" patient states sob, and dark stools for few days        History of Present Illness   Andrew Gordillo is a 55 y.o. with PMH significant for CKD 5 was admitted with leg swelling, exertional SOB and low Hb. No chest pain. No SOB at rest. Patient also gaining weight. Patient reported some black stools. No focal weakness. Review of Systems   Positive in bold or unable to assess     GEN: Fever, chills, night sweats. HEENT: Changes in vision, sore throat, rhinorrhea   CVS: Chest pain, palpitations,swelling or edema in legs  Pulmonary: Cough, hemoptysis, SOB   GI: Nausea, vomiting, diarrhea, constipation, abdominal pain  : Bladder incontinence, dysuria,hematuria. MSK: Muscle or joint or bone pains  Skin: Rashes, ulcers, skin thickness  CNS: Headache, dizziness, confusion, focal weakness, seizure. Psych: Anxiety, agitation, depression. Reviewed all 12 systems, negative except as above. Past Medical History     Past Medical History:   Diagnosis Date    Anxiety 3/28/2017    CKD (chronic kidney disease) 6/16/2017    Hyperlipidemia LDL goal <100 6/16/2017    Hypertension     Obesity (BMI 30.0-34.9) 6/16/2017         Past Surgical History     Past Surgical History:   Procedure Laterality Date    IR TUNNELED CATHETER PLACEMENT GREATER THAN 5 YEARS  10/17/2022    IR TUNNELED CATHETER PLACEMENT GREATER THAN 5 YEARS 10/17/2022 [facility]         Family History     History reviewed. No pertinent family history. Social History     Social History     Tobacco Use    Smoking status: Some Days     Years: 4.00     Types: Cigarettes    Smokeless tobacco: Never    Tobacco comments:      2 a week   Substance Use Topics    Alcohol use: No          Past medical, family, and social histories were reviewed as previously documented. Updates were made as necessary.       Inpatient Medications and Allergies       Scheduled Meds:   torsemide  60 mg Oral Daily    sodium chloride flush  5-40 mL IntraVENous 2 times per day    carvedilol  25 mg Oral BID WC    NIFEdipine  60 mg Oral Daily    iron sucrose  100 mg IntraVENous Q24H    isosorbide mononitrate  60 mg Oral Daily    losartan  100 mg Oral Daily    heparin (porcine)  5,000 Units SubCUTAneous 3 times per day    sodium chloride flush  5-40 mL IntraVENous 2 times per day    pantoprazole  40 mg Oral BID AC    calcitRIOL  0.25 mcg Oral Daily       Allergies: No Known Allergies      Vital Signs     Vitals:    10/21/22 1227   BP: (!) 156/81   Pulse: 84   Resp: 18   Temp: 98.8 °F (37.1 °C)   SpO2: 94%         Intake/Output Summary (Last 24 hours) at 10/21/2022 1426  Last data filed at 10/21/2022 1030  Gross per 24 hour   Intake 670 ml   Output 2875 ml   Net -2205 ml           Physical Exam     General appearance: NAD  Head: Normocephalic, without obvious abnormality, atraumatic   Mouth: Moist mucous membrane  Neck: Supple. Lungs: Good air entry bilaterally. No respiratory distress on RA.    Heart: S1, S2.  Abdomen: soft, non-tender non-distended  Extremities:Chronic appearing significant leg edema  Skin: No concerning rashes noted  Psych: good eye contact, normal affect  Neuro: AAO x 3, non focal.       Laboratory Data     Please see above     Diagnostic Studies   Pertinent images reviewed

## 2022-10-21 NOTE — PROGRESS NOTES
Hospitalist Progress Note      PCP: Mai Daugherty MD    Date of Admission: 10/14/2022    Chief Complaint:  leg swell, shortness of breath      Subjective:   Seen and examined patient at bedside. Afebrile, VS-stable, no overnight events. AAOx4. Patient had HD today. Denies any bloody or black stools. Denies any other signs of bleeding. He wants to go home today and follow GI as outpatient for work-up. Denies chest pain, SOB, nausea or vomiting, diarrhea or constipation. Renal ok for discharge. Will discharge home today.       Medications:  Reviewed    Infusion Medications    sodium chloride      sodium chloride      sodium chloride      sodium chloride       Scheduled Medications    torsemide  60 mg Oral Daily    sodium chloride flush  5-40 mL IntraVENous 2 times per day    carvedilol  25 mg Oral BID WC    NIFEdipine  60 mg Oral Daily    iron sucrose  100 mg IntraVENous Q24H    isosorbide mononitrate  60 mg Oral Daily    losartan  100 mg Oral Daily    heparin (porcine)  5,000 Units SubCUTAneous 3 times per day    sodium chloride flush  5-40 mL IntraVENous 2 times per day    pantoprazole  40 mg Oral BID AC    calcitRIOL  0.25 mcg Oral Daily     PRN Meds: heparin (porcine), sodium chloride flush, sodium chloride, acetaminophen, labetalol, sodium chloride, sodium chloride flush, sodium chloride, ondansetron **OR** ondansetron, polyethylene glycol, hydrOXYzine HCl, sodium chloride      Intake/Output Summary (Last 24 hours) at 10/21/2022 1557  Last data filed at 10/21/2022 1454  Gross per 24 hour   Intake 875 ml   Output 2875 ml   Net -2000 ml       Physical Exam Performed:    BP (!) 156/81   Pulse 84   Temp 98.8 °F (37.1 °C) (Oral)   Resp 18   Ht 6' 2\" (1.88 m)   Wt 241 lb 13.5 oz (109.7 kg)   SpO2 94%   BMI 31.05 kg/m²       GEN alert, in no distress  HEENT normocephalic, anicteric sclera, EOMI, mucosa moist, no stridor  NECK supple, trachea midline  CHEST Rt Tunneled HD cath, pressure dressing. RESP on RA, in no distress, clear to auscultation  CARDS RRR, S1, S2, no murmurs, chronic bilateral LE edema, radial pulse 2+, DP pulse  ABD +BS, soft nontender  MSK no cyanosis, no clubbing  SKIN chronic skin changes with chronic edema, warm, dry  NEURO alert, oriented x 3, no facial asymmetry, no dysarthria, moving spontaneously  PSYCH normal mood      Labs:   Recent Labs     10/19/22  0506 10/20/22  0538 10/21/22  0437   WBC 13.5* 13.7* 12.1*   HGB 7.8* 7.5* 7.3*   HCT 23.2* 23.4* 21.6*    212 205     Recent Labs     10/19/22  0507 10/20/22  0538 10/21/22  0437    137 134*   K 3.4* 3.9  3.9 4.2    101 99   CO2 24 25 22   BUN 60* 33* 40*   CREATININE 10.5* 7.9* 9.5*   CALCIUM 8.2* 8.5 8.3     Recent Labs     10/20/22  0538 10/21/22  0437   AST 12* 12*   ALT 6* 7*   BILITOT 0.5 0.3   ALKPHOS 97 81     No results for input(s): INR in the last 72 hours. No results for input(s): Nimco Macleod in the last 72 hours. Urinalysis:      Lab Results   Component Value Date/Time    NITRU Negative 10/15/2022 09:27 PM    WBCUA 10-20 10/15/2022 09:27 PM    RBCUA 0-2 10/15/2022 09:27 PM    BLOODU SMALL 10/15/2022 09:27 PM    SPECGRAV 1.020 10/15/2022 09:27 PM    GLUCOSEU Negative 10/15/2022 09:27 PM       Radiology:  VL Extremity Venous Bilateral   Final Result      IR TUNNELED CVC PLACE WO SQ PORT/PUMP > 5 YEARS   Final Result   1. Satisfactory ultrasound and fluoroscopic-guided placement and positioning of tunneled  right internal jugular dialysis catheter   2. Catheter tip in satisfactory positioning of right atrium and is ready for use. 3. Conscious sedation without complication         XR CHEST PORTABLE   Final Result   1. Cardiomegaly. XR CHEST (2 VW)   Final Result      Gross cardiac enlargement. .      No acute traumatic consolidation. Assessment/Plan:     This is 45-year-old male with history of HTN, HLD, CKD V (not on dialysis), who presented with leg swelling and recent abnormal labs. Patient states that he began having bilateral leg swelling about 2 weeks. He states that he has also been having a dyspnea on exertion but denies any chest pain. He also states that he has recently gained about 20 to 30 pounds. patient also states that he was called due to very low hemoglobin on recent labs. He states that he has been having dark stools. Patient denies any lightheadedness/ dizziness, nausea/vomiting, abdominal pain, diarrhea/constipation, dysuria or hematuria. Patient states that he is compliant with his home BP medications. Patient has CKD V and has had multiple referrals to nephrologist but did not go to them. On arrival patient was hypertensive with a /104. Labs showed hemoglobin of 5.7, creatinine of 13.8, and BNP >70,000. CXR showed gross cardiac enlargement with no acute traumatic consolidation. Nephrology was contacted in the ED, recommended no acute indication for emergent dialysis and he was given 80mg IV lasix. He was also given 1g IV ceftriaxone for suspected upper GI bleed. Active Hospital Problems    Diagnosis Date Noted    Anemia [D64.9] 10/16/2022     Priority: Medium    Leg swelling [M79.89] 10/16/2022     Priority: Medium    Acute renal failure (ARF) ([facility] [address] [N17.9] 10/15/2022     Priority: Medium    ESRD (end stage renal disease) ([facility] [address] [N18.6] 10/15/2022     Priority: Medium    CKD (chronic kidney disease) [N18.9] 06/16/2017    Essential hypertension [I10] 12/14/2016     New presentation of ESRD  - Started HD run #1 10/16; run #2 on 10/17.  - s/p  tunneled RIJ HD Cath 10/17/22.  - Needs dialysis chair. Normocytic Anemia, related to CKD vs Upper GI bleed  - adm HgB 5.7 on 10/15/22. MCV 89.9.   Baseline HgB 12.8.  -S/p 3 units PRBCs w/ Hgb in the 8.  -Trend Hgb q12h today; can probably space to qd tomorrow if stable  - Continue protonix 40 mg PO BID.  - GI consulted, no acute intervention indicated currently; will re-enngage if clinical changes  -Resume diet  On iv iron   10/20  Hb today 7.5 despite being on iv iron and will continue to have IV iron during dialysis. Concern about possible GI bleeding  Consult GI, planning for EGD today but patient refused and wants to go home and follow up as outpatient. No signs of bleeding. GI ok with the plan. New Acute HFrEF  New CM  Abnormal EKG  Patient presented with bilateral leg swelling, weight gain, and shortness of breath on exertion. On presentation, BNP >70,000. CXR showed gross cardiac enlargement with no acute traumatic consolidation.  -Echocardiogram 10/15/22 showed normal LV size, mild concentric LVH, mild-mod decreased LVEF 4045%. Global hypokinesis. Grade III diastolic dysfunction with elevated LV filling pressures. LA and RA dilated. Mild valvular disease.  -Cardiology consulted. Plan for coronary angiogram on 10/19. .  - HD for volume control.  - Strict I's and O, Daily weights  -Underwent LHC 10/19 with finding of non ischemic cardiomyopathy      Severe Hypertension  - per renal, d/dacia home BP medications  -Patient's BP medication has been adjusted by renal and discharged on Carvedilol 25 mg daily, Imdur 60 mg daily, losartan 100mg daily, Nifedipine 60mg daily and torsemide 60 mg daily. Diet: ADULT DIET; Regular; Low Sodium (2 gm); Low Potassium (Less than 3000 mg/day); Low Phosphorus (Less than 1000 mg)  Code Status: Full Code  PT/OT Eval Status: N/A    DVT Prophylaxis: SCD, acute anemia  Diet: ADULT DIET;  Regular  Code Status: Full Code    PT/OT Eval Status:     Dispo - D/c home today    Karla Ybarra MD

## 2022-10-21 NOTE — PROGRESS NOTES
Patient is requesting to be dc ASAP and folllow up with GI for colonoscopy in outpatient setting. GI was notified. Awaiting for the MD to call back.

## 2022-10-22 NOTE — TELEPHONE ENCOUNTER
From: Jerald Kat  Sent: 10/21/2022 4:35 PM EDT  [facility] [address] Clinical Staff  Subject: visit reminder    he is home and continues dialysis 3 times a week starting tomorrow. they have also changed his blood pressure medicine.

## 2022-10-22 NOTE — TELEPHONE ENCOUNTER
Message printed and reviewed with Dr Hayley Gloria. No new orders at this time. Pt does have an appt in less than a month.

## 2022-10-23 LAB — CULTURE, BLOOD 2: NORMAL

## 2022-10-24 ENCOUNTER — FOLLOWUP TELEPHONE ENCOUNTER (OUTPATIENT)
Dept: INPATIENT UNIT | Age: 47
End: 2022-10-24

## 2022-10-25 ENCOUNTER — TELEPHONE (OUTPATIENT)
Dept: PRIMARY CARE CLINIC | Age: 47
End: 2022-10-25

## 2022-10-25 NOTE — TELEPHONE ENCOUNTER
[facility] Trans[facility] Initial Follow Up Call    Outreach made within 2 business days of discharge: Yes    Patient: Abdirizak Drummond Patient : 1975   MRN: [id number]  Reason for Admission: There are no discharge diagnoses documented for the most recent discharge. Discharge Date: 10/21/22       Spoke with: [facility]    Discharge department/facility: [facility] Interactive Patient Contact:  Was patient able to fill all prescriptions: Yes  Was patient instructed to bring all medications to the follow-up visit: Yes  Is patient taking all medications as directed in the discharge summary?  Yes  Does patient understand their discharge instructions: Yes  Does patient have questions or concerns that need addressed prior to 7-14 day follow up office visit: no    Scheduled appointment with PCP within 7-14 days    Follow Up  Future Appointments   Date Time Provider Jacques Hitchcock   10/26/2022 11:30 AM YONG Dodson CNP [facility]   2022  4:15 PM MD [address] [facility] Jose A - SUZAN Carballo, [address]

## 2022-10-25 NOTE — PROGRESS NOTES
Attempted to reach patient for 72 hour HF hospital follow up. Calls made at [phone]; 1230; and 1542. All calls were unanswered. Of note, pt is new HD and could potentially have had HD scheduled for this date.  Pt does have cardiology follow up on 10/26 with Kade Blankenship NP.

## 2022-11-09 ENCOUNTER — PATIENT MESSAGE (OUTPATIENT)
Dept: PRIMARY CARE CLINIC | Age: 47
End: 2022-11-09

## 2022-11-09 RX ORDER — METOPROLOL SUCCINATE 100 MG/1
100 TABLET, EXTENDED RELEASE ORAL DAILY
Qty: 90 TABLET | Refills: 0 | OUTPATIENT
Start: 2022-11-09

## 2022-11-09 RX ORDER — AMLODIPINE BESYLATE 10 MG/1
TABLET ORAL
Qty: 90 TABLET | Refills: 0 | OUTPATIENT
Start: 2022-11-09

## 2022-11-09 NOTE — TELEPHONE ENCOUNTER
From: Abraham Morrison  To: Dr. Ibarra Bridge: 11/9/2022 10:06 AM EST  Subject: Medicines    listed below are the medicines he is on now;    nifedipine 60mg  pantoprazole 40mg  carvedilol 25mg  losartan 100 mg  isosorbide mononitrate 60mg  soaanz 60mg    any questions feel free to call.  thank you guys, you are the best

## 2022-11-09 NOTE — TELEPHONE ENCOUNTER
Called and D/W pt's wife.       Pt is not taking the Amlodipine or Metoprolol any longer, after being discharged from hospital.

## 2022-11-13 ENCOUNTER — PATIENT MESSAGE (OUTPATIENT)
Dept: PRIMARY CARE CLINIC | Age: 47
End: 2022-11-13

## 2022-11-16 NOTE — TELEPHONE ENCOUNTER
From: Jamee Quintanilla  To: Dr. Tammy Tang: 11/13/2022 10:40 AM EST  Subject: prescription refill    Laykaterine Melchor is prescribed Soaanz 60 mg and that costs 264.00. is there another water pill that is much cheaper he can be prescribed? Doctor Noamie Trinh prescribed this medicine.     thank you,    Wayne Forward

## 2022-12-27 ENCOUNTER — HOSPITAL ENCOUNTER (EMERGENCY)
Age: 47
Discharge: HOME | End: 2022-12-27
Attending: EMERGENCY MEDICINE

## 2022-12-27 VITALS
HEART RATE: 83 BPM | DIASTOLIC BLOOD PRESSURE: 123 MMHG | WEIGHT: 222.2 LBS | SYSTOLIC BLOOD PRESSURE: 167 MMHG | RESPIRATION RATE: 18 BRPM | BODY MASS INDEX: 28.53 KG/M2 | TEMPERATURE: 98.1 F | OXYGEN SATURATION: 100 %

## 2022-12-27 DIAGNOSIS — I10 ESSENTIAL HYPERTENSION: Primary | ICD-10-CM

## 2022-12-27 PROCEDURE — 6370000000 HC RX 637 (ALT 250 FOR IP): Performed by: EMERGENCY MEDICINE

## 2022-12-27 PROCEDURE — 99283 EMERGENCY DEPT VISIT LOW MDM: CPT

## 2022-12-27 RX ORDER — HYDRALAZINE HYDROCHLORIDE 10 MG/1
10 TABLET, FILM COATED ORAL ONCE
Status: COMPLETED | OUTPATIENT
Start: 2022-12-27 | End: 2022-12-27

## 2022-12-27 RX ORDER — HYDRALAZINE HYDROCHLORIDE 10 MG/1
10 TABLET, FILM COATED ORAL DAILY
Qty: 90 TABLET | Refills: 3 | Status: SHIPPED | OUTPATIENT
Start: 2022-12-27 | End: 2023-12-27

## 2022-12-27 RX ADMIN — HYDRALAZINE HYDROCHLORIDE 10 MG: 10 TABLET, FILM COATED ORAL at 12:08

## 2022-12-27 ASSESSMENT — PAIN - FUNCTIONAL ASSESSMENT: PAIN_FUNCTIONAL_ASSESSMENT: NONE - DENIES PAIN

## 2022-12-27 NOTE — DISCHARGE INSTRUCTIONS
We have added 1 medication hydralazine daily to your regimen. Please take this with your other blood pressure medications. Please discuss with nephrology and follow-up as scheduled in the next few days, return for any concerning symptoms.

## 2022-12-27 NOTE — ED PROVIDER NOTES
[address]          ATTENDING PHYSICIAN NOTE       Date of evaluation: 12/27/2022    Chief Complaint     Hypertension (Pt. Presents to ED with c/o HTN with SBP in 190s. PD patient. Home dialysis RN advised patient to come to ED. PMHx. HTN.)      History of Present Illness     Jamee Quintanilla is a 52 y.o. male with ESRD who presents today with elevated diastolic blood pressure for the past week. Patient has end-stage renal disease has been on dialysis for the past 2 months and is doing nighttime dialysis at home. They take his vital signs before and have noticed that his diastolic blood pressures been over 100 for the past week. He is per report currently on losartan, carvedilol, nifedipine, torsemide and then was given clonidine which she took this morning. Here blood pressure is 158/111. He denies any symptoms. States he feels fine denies any volume overload last had a full session of nighttime dialysis last night. Review of Systems     Review of Systems  A complete ROS was performed and is otherwise negative  Past Medical, Surgical, Family, and Social History     He has a past medical history of Anxiety, CKD (chronic kidney disease), Hyperlipidemia LDL goal <100, Hypertension, and Obesity (BMI 30.0-34.9). He has a past surgical history that includes IR TUNNELED CVC PLACE WO SQ PORT/PUMP > 5 YEARS (10/17/2022). His family history is not on file. He reports that he has been smoking cigarettes. He has never used smokeless tobacco. He reports that he does not drink alcohol and does not use drugs.     Medications     Previous Medications    CARVEDILOL (COREG) 25 MG TABLET    Take 1 tablet by mouth 2 times daily (with meals)    ISOSORBIDE MONONITRATE (IMDUR) 60 MG EXTENDED RELEASE TABLET    Take 1 tablet by mouth daily    LOSARTAN (COZAAR) 100 MG TABLET    Take 1 tablet by mouth daily    NIFEDIPINE (PROCARDIA XL) 60 MG EXTENDED RELEASE TABLET    Take 1 tablet by mouth daily    PANTOPRAZOLE (PROTONIX) 40 MG TABLET    Take 1 tablet by mouth 2 times daily (before meals)    TORSEMIDE 60 MG TABS    Take 60 mg by mouth daily       Allergies     He has No Known Allergies. Physical Exam     INITIAL VITALS: BP: (!) 163/109, Temp: 98.1 °F (36.7 °C), Heart Rate: 82, Resp: 18, SpO2: 98 %   Physical Exam  General: Well appearing in NAD,   HEENT:  head is atraumatic, sclera are clear, oropharynx is nonerythematous, mucus membranes are moist  Neck: Trachea midline  Chest: Nonlabored respirations, clear to auscultation bilaterally  Cardiovascular: Regular rate  and rhythm, 2+ radial pulses bilaterally  Abdominal: Nondistended abdomen, soft, nontender without rebound or gaurding  Skin: Warm, dry well perfused, no rashes  Extremities: no obvious deformities, no tenderness to palpation diffusely  Neurologic:  Alert and oriented, speech is clear and intact without dysarthria, gait is intact  Psychologic: appropriate mood and affect   Diagnostic Results     EKG       RADIOLOGY:  No orders to display       LABS:   No results found for this visit on 12/27/22. ED BEDSIDE ULTRASOUND:      RECENT VITALS:  BP: (!) 158/111,Temp: 98.1 °F (36.7 °C), Heart Rate: 82, Resp: 20, SpO2: 100 %     Procedures         ED Course     Nursing Notes, Past Medical Hx, Past Surgical Hx, Social Hx,Allergies, and Family Hx were reviewed. patient was given the following medications:  Orders Placed This Encounter   Medications    hydrALAZINE (APRESOLINE) tablet 10 mg    hydrALAZINE (APRESOLINE) 10 MG tablet     Sig: Take 1 tablet by mouth daily     Dispense:  90 tablet     Refill:  3       CONSULTS:  1 [facility] DECISIONMAKING / ASSESSMENT / Anabela Ronal is a 52 y.o. male with end-stage renal disease presenting today with elevated solid blood pressure. Here he is asymptomatic blood pressure is 158/111. Tried clonidine at home with no relief.   No indication for labs he is getting labs tomorrow and otherwise is asymptomatic no signs of fluid overload. I talked with Dr. Piotr Pagan who knows the patient and he suggested p.o. hydralazine 10 here and then adding hydralazine daily to his regimen. They can monitor his blood pressure and he has follow-up in 3 days with nephrology. This was discussed with the patient he was discharged in stable condition. Clinical Impression     1.  Essential hypertension        Disposition     PATIENT REFERRED TO:  Mindy Shaffer MD  [facility] Professor Shaggy [facility] [address]  [phone]      As scheduled    DISCHARGE MEDICATIONS:  New Prescriptions    HYDRALAZINE (APRESOLINE) 10 MG TABLET    Take 1 tablet by mouth daily       DISPOSITION Discharge - Pending Orders Complete 12/27/2022 11:59:44 AM       Latrell Sweeney MD  12/27/22 1200

## 2023-02-07 NOTE — TELEPHONE ENCOUNTER
Dr Homer Schirmer:  a request for Amlodipine. But, it looks like med was D/C'd on 10/21/22. Pt was D/C'd from hospital that day. Summary says BP med's were adjusted, and some original c/o's of leg edema. But nothing that specifically says not to take the Amlodipine. Hx of stage V CKD. Refill med or not?

## 2023-02-08 RX ORDER — ISOSORBIDE MONONITRATE 60 MG/1
60 TABLET, EXTENDED RELEASE ORAL DAILY
Qty: 30 TABLET | Refills: 3 | Status: SHIPPED | OUTPATIENT
Start: 2023-02-08

## 2023-02-08 RX ORDER — NIFEDIPINE 60 MG/1
60 TABLET, EXTENDED RELEASE ORAL DAILY
Qty: 30 TABLET | Refills: 3 | Status: SHIPPED | OUTPATIENT
Start: 2023-02-08

## 2023-02-08 RX ORDER — CARVEDILOL 25 MG/1
25 TABLET ORAL 2 TIMES DAILY WITH MEALS
Qty: 60 TABLET | Refills: 3 | Status: SHIPPED | OUTPATIENT
Start: 2023-02-08

## 2023-02-08 RX ORDER — LOSARTAN POTASSIUM 100 MG/1
100 TABLET ORAL DAILY
Qty: 30 TABLET | Refills: 3 | Status: SHIPPED | OUTPATIENT
Start: 2023-02-08

## 2023-02-08 RX ORDER — AMLODIPINE BESYLATE 10 MG/1
TABLET ORAL
Qty: 90 TABLET | Refills: 0 | OUTPATIENT
Start: 2023-02-08

## 2023-02-08 RX ORDER — HYDRALAZINE HYDROCHLORIDE 10 MG/1
10 TABLET, FILM COATED ORAL DAILY
Qty: 90 TABLET | Refills: 3 | Status: SHIPPED | OUTPATIENT
Start: 2023-02-08 | End: 2024-02-08

## 2023-02-08 RX ORDER — PANTOPRAZOLE SODIUM 40 MG/1
40 TABLET, DELAYED RELEASE ORAL
Qty: 30 TABLET | Refills: 3 | Status: SHIPPED | OUTPATIENT
Start: 2023-02-08

## 2023-06-27 RX ORDER — CARVEDILOL 25 MG/1
TABLET ORAL
Qty: 60 TABLET | Refills: 3 | OUTPATIENT
Start: 2023-06-27

## 2023-07-08 RX ORDER — NIFEDIPINE 60 MG/1
TABLET, EXTENDED RELEASE ORAL
Qty: 30 TABLET | Refills: 3 | OUTPATIENT
Start: 2023-07-08

## 2023-07-24 RX ORDER — NIFEDIPINE 60 MG/1
TABLET, EXTENDED RELEASE ORAL
Qty: 30 TABLET | Refills: 3 | OUTPATIENT
Start: 2023-07-24

## 2023-07-24 NOTE — TELEPHONE ENCOUNTER
Last BP:  167/123    Last OV:  10/14/22    Has been to the hospital twice since then. Has cancelled/no-show'd last 2 appts with Dr Harjinder Bennett. No future appts listed.

## 2023-07-31 RX ORDER — ISOSORBIDE MONONITRATE 60 MG/1
60 TABLET, EXTENDED RELEASE ORAL DAILY
Qty: 30 TABLET | Refills: 3 | OUTPATIENT
Start: 2023-07-31

## 2023-07-31 RX ORDER — CARVEDILOL 25 MG/1
TABLET ORAL
Qty: 60 TABLET | Refills: 3 | OUTPATIENT
Start: 2023-07-31

## 2023-07-31 NOTE — TELEPHONE ENCOUNTER
Dr Leopoldo Day' patient. Last OV:  10/14/22      All BP's for the last year > 140/90    No future appt's listed. When patient makes an appt, please let me know and will send in a Rx until he comes  in.

## 2023-11-15 RX ORDER — NIFEDIPINE 60 MG/1
60 TABLET, EXTENDED RELEASE ORAL DAILY
Qty: 30 TABLET | Refills: 3 | OUTPATIENT
Start: 2023-11-15

## 2023-11-15 NOTE — TELEPHONE ENCOUNTER
Last OV:   10/14/22    No future appts listed. Has either cancelled or no-show'd last 2 OV's. If patient makes an appt, please let me know and will send in 30-day supply until he comes in for fasting labs.

## 2024-02-19 RX ORDER — PANTOPRAZOLE SODIUM 40 MG/1
40 TABLET, DELAYED RELEASE ORAL
Qty: 30 TABLET | Refills: 3 | OUTPATIENT
Start: 2024-02-19

## 2024-02-19 NOTE — TELEPHONE ENCOUNTER
Last OV:   10/14/2022.     No future appts listed.     If patient makes an appt, please let me know.  Will send in #30 day supply until he is seen.